# Patient Record
Sex: FEMALE | Race: WHITE | Employment: UNEMPLOYED | URBAN - METROPOLITAN AREA
[De-identification: names, ages, dates, MRNs, and addresses within clinical notes are randomized per-mention and may not be internally consistent; named-entity substitution may affect disease eponyms.]

---

## 2017-09-29 ENCOUNTER — HOSPITAL ENCOUNTER (OUTPATIENT)
Dept: GENERAL RADIOLOGY | Age: 57
Discharge: HOME | End: 2017-09-29
Payer: COMMERCIAL

## 2017-09-29 DIAGNOSIS — R52 PAIN: ICD-10-CM

## 2017-09-29 PROCEDURE — 73560 X-RAY EXAM OF KNEE 1 OR 2: CPT

## 2017-10-10 ENCOUNTER — HOSPITAL ENCOUNTER (OUTPATIENT)
Dept: PHYSICAL THERAPY | Age: 57
Discharge: HOME | End: 2017-10-10
Payer: COMMERCIAL

## 2017-10-10 PROCEDURE — 97162 PT EVAL MOD COMPLEX 30 MIN: CPT

## 2017-10-10 PROCEDURE — 97110 THERAPEUTIC EXERCISES: CPT

## 2017-10-10 NOTE — PROGRESS NOTES
Ambulatory/Rehab Services H2 Model Falls Risk Assessment    Risk Factor Pts. ·   Confusion/Disorientation/Impulsivity  []    4 ·   Symptomatic Depression  []   2 ·   Altered Elimination  []   1 ·   Dizziness/Vertigo  []   1 ·   Gender (Male)  []   1 ·   Any administered antiepileptics (anticonvulsants):  []   2 ·   Any administered benzodiazepines:  []   1 ·   Visual Impairment (specify):  []   1 ·   Portable Oxygen Use  []   1 ·   Orthostatic ? BP  []   1 ·   History of Recent Falls (within 3 mos.)  []   5     Ability to Rise from Chair (choose one) Pts. ·   Ability to rise in a single movement  []   0 ·   Pushes up, successful in one attempt  []   1 ·   Multiple attempts, but successful  []   3 ·   Unable to rise without assistance  []   4   Total: (5 or greater = High Risk) 0     Falls Prevention Plan:   []                Physical Limitations to Exercise (specify):   []                Mobility Assistance Device (type):   []                Exercise/Equipment Adaptation (specify):    ©2010 [facility] of [address] Patent #[id number].  Federal Law prohibits the replication, distribution or use without written permission from [facility] [employer]ADEUM

## 2017-10-10 NOTE — PROGRESS NOTES
Suleiman Bethea  : 1960  Payor: Astrid Martinez / [place]UE E[place]ENTIAL[place] DARCY / Product Type: DARCY /  [address]  at [facility] OSIRIS ZIMMER  [address] [facility], [address] [place] [address] [address], [address] [facility]  [phone]   [phone]       OUTPATIENT PHYSICAL [place] Assessment 10/10/2017    ICD-10: Treatment Diagnosis: (M25.562) Pain in left knee  (M25.561) Pain in right knee  Precautions/Allergies:   Codeine   Fall Risk Score: 0 (? 5 = High Risk)  MD Orders: eval and treat aquatic therapy preferrable MEDICAL/REFERRING DIAGNOSIS:  Pain in left knee [M25.562]  Pain in right knee [M25.561]   DATE OF ONSET: L knee 1 yr ago,  R knee chronic  REFERRING PHYSICIAN: Sami Gutierrez DO RETURN PHYSICIAN APPOINTMENT: 17     INITIAL ASSESSMENT:  Ms. Felecia Damon presents with B knee pain which s   PROBLEM LIST (Impacting functional limitations):  1. Decreased Strength  2. Decreased Ambulation Ability/Technique  3. Increased Pain  4. Decreased Activity Tolerance INTERVENTIONS PLANNED:  1. Home Exercise Program (HEP)  2. Therapeutic Exercise/Strengthening  3. aquatic therapy   TREATMENT PLAN:  Effective Dates: 10/10/17 TO 1/10/17. Frequency/Duration: 2 times a week for 8 weeks  GOALS: (Goals have been discussed and agreed upon with patient.)  Short-Term Functional Goals: Time Frame: 2 weeks  1. Pt to perform 45 minutes of aquatic therapy consistently. 2. Pt independent in HEP to address deficits. 3. Pt to report a decrease in symptoms  Discharge Goals: Time Frame: 8 weeks  1. Increase in LEFS score by 10 points indicating improved function. 2. Increase in strength of HS and quads to improve stamina for gait activities. 3. Pt independent in HEP to maintain gains made in PT  4. Pt to report pain 3/10 or less consistently.   Rehabilitation Potential For Stated Goals: [address] [facility] therapy, I certify that the treatment plan above will be carried out by a therapist or under their direction. Thank you for this referral,  Nika Sorenson, PT     Referring Physician Signature: Sami Gutierrez,               Date                    The information in this section was collected on 10/10/17 (except where otherwise noted). HISTORY:   History of Present Injury/Illness (Reason for Referral):  B knee pain worse in the morning. Increased with housework, driving long distances  Describes sharp tingling from knee to big toe bilaterally. Had an MRI of R knee- which showed a small cartilage tear. Pt states the more she does the more her knees hurt. Rates pain 5/10 worst -9/10 worst, 3/10 best.    Past Medical History/Comorbidities:   Previously had shingles which affected her L knee area, IBS,   Social History/Living Environment:    lives with . Stairs at home. Prior Level of Function/Work/Activity:    Other Clinical Tests:          MRI, X ray had fluid removed, cortisone shots were not helpful  Personal Factors: Other factors that influence how disability is experienced by the patient:  Pt's  is on disability from his job. Pt has not worked in 2 yrs as she was laid off. Current Medications:       Current Outpatient Prescriptions:     ALPRAZolam (XANAX) 0.5 mg tablet, One tablet 30 minutes prior to procedure., Disp: 4 Tab, Rfl: 0    celecoxib (CELEBREX) 200 mg capsule, TAKE 1 CAPSULE TWICE A DAY, Disp: , Rfl: 5    DULoxetine (CYMBALTA) 60 mg capsule, Take 1 Cap by mouth daily. , Disp: 30 Cap, Rfl: 12   Date Last Reviewed:  10/10/2017   Number of Personal Factors/Comorbidities that affect the Plan of Care: 3+: HIGH COMPLEXITY   EXAMINATION:   Observation/Orthostatic Postural Assessment:          L knee has minimal swelling  Palpation:          Tender to palpation medial and lateral joint line of B knees  ROM:          ROM WNL  Strength:          B HS weakness 4/5,            Body Structures Involved:  1. Joints  2.  Muscles Body Functions Affected:  1. Sensory/Pain  2. Neuromusculoskeletal Activities and Participation Affected:  1. Mobility  2. Domestic Life   Number of elements (examined above) that affect the Plan of Care: 4+: HIGH COMPLEXITY   CLINICAL PRESENTATION:   Presentation: Evolving clinical presentation with changing clinical characteristics: MODERATE COMPLEXITY   CLINICAL DECISION MAKING:   Outcome Measure: Tool Used: Lower Extremity Functional Scale (LEFS)  Score:  Initial: 21/80 Most Recent: X/80 (Date: -- )   Interpretation of Score: 20 questions each scored on a 5 point scale with 0 representing \"extreme difficulty or unable to perform\" and 4 representing \"no difficulty\". The lower the score, the greater the functional disability. 80/80 represents no disability. Minimal detectable change is 9 points. Score 80 79-63 62-48 47-32 31-16 15-1 0   Modifier CH CI CJ CK CL CM CN           Medical Necessity:   · Pt requires PT for rehabilitation and guidance for B knee pain affecting mobility. .  Reason for Services/Other Comments:  · Pt to begin aquatic therapy. .   Use of outcome tool(s) and clinical judgement create a POC that gives a: Questionable prediction of patient's progress: MODERATE COMPLEXITY            TREATMENT:   (In addition to Assessment/Re-Assessment sessions the following treatments were rendered)  Pre-treatment Symptoms/Complaints:  Pt rated pain from 3-7 during evaluation and ex. Pain: Initial:   5/10  Post Session:  7/10     Therapeutic Exercise: ( ):  15 min Exercises per grid below to improve mobility and strength. Required moderate visual and verbal cues to promote proper body alignment, promote proper body posture and promote proper body mechanics. Progressed repetitions as indicated.    Date:  10/10 Date:   Date:     Activity/Exercise Parameters Parameters Parameters   Quad sets 10     SLR 10     SAQ 10     Prone knee flex 10     Clam shell 10                 HEP - print out for above ex given for HEP.  Treatment/Session Assessment:    · Response to Treatment: Pt had increased pain with all movements during evaluation. She also complained of increasing tingling into B big toes. She was able to do the exercises. · Compliance with Program/Exercises: Will assess as treatment progresses. · Recommendations/Intent for next treatment session: \"Next visit will focus on aquatic therapy\".   Total Treatment Duration: 60 min  PT Patient Time In/Time Out  Time In: 0100  Time Out: 0200    Nain Skinner PT

## 2017-10-16 ENCOUNTER — HOSPITAL ENCOUNTER (OUTPATIENT)
Dept: PHYSICAL THERAPY | Age: 57
Discharge: HOME | End: 2017-10-16
Payer: COMMERCIAL

## 2017-10-16 PROCEDURE — 97113 AQUATIC THERAPY/EXERCISES: CPT

## 2017-10-16 NOTE — PROGRESS NOTES
Nela Roman  : 1960  Payor: Cami Bedoya / [place]ENTIAL[place] DARCY / Product Type: DARCY /  [address]  at [address] [place] [address] Rd  [address] [facility], [address] [place] [address], [facility] [address]  [phone]   [phone]       OUTPATIENT PHYSICAL [address] Note 10/16/2017    ICD-10: Treatment Diagnosis: (M25.562) Pain in left knee  (M25.561) Pain in right knee  Precautions/Allergies:   Codeine   Fall Risk Score: 0 (? 5 = High Risk)  MD Orders: eval and treat aquatic therapy preferrable MEDICAL/REFERRING DIAGNOSIS:  Pain in left knee [M25.562]  Pain in right knee [M25.561]   DATE OF ONSET: L knee 1 yr ago,  R knee chronic  REFERRING PHYSICIAN: Sami Gutierrez DO  RETURN PHYSICIAN APPOINTMENT: 17     INITIAL ASSESSMENT:  Ms. Brian Ho presents with B knee pain which s   PROBLEM LIST (Impacting functional limitations):  1. Decreased Strength  2. Decreased Ambulation Ability/Technique  3. Increased Pain  4. Decreased Activity Tolerance INTERVENTIONS PLANNED:  1. Home Exercise Program (HEP)  2. Therapeutic Exercise/Strengthening  3. aquatic therapy   TREATMENT PLAN:  Effective Dates: 10/10/17 TO 1/10/17. Frequency/Duration: 2 times a week for 8 weeks  GOALS: (Goals have been discussed and agreed upon with patient.)  Short-Term Functional Goals: Time Frame: 2 weeks  1. Pt to perform 45 minutes of aquatic therapy consistently. 2. Pt independent in HEP to address deficits. 3. Pt to report a decrease in symptoms  Discharge Goals: Time Frame: 8 weeks  1. Increase in LEFS score by 10 points indicating improved function. 2. Increase in strength of HS and quads to improve stamina for gait activities. 3. Pt independent in HEP to maintain gains made in PT  4. Pt to report pain 3/10 or less consistently.   Rehabilitation Potential For Stated Goals: 3000 [facility] therapy, I certify that the treatment plan above will be carried out by a therapist or under their direction. Thank you for this referral,  Norm Anderson PT     Referring Physician Signature: Sami Gutierrez,               Date                    The information in this section was collected on 10/10/17 (except where otherwise noted). HISTORY:   History of Present Injury/Illness (Reason for Referral):  B knee pain worse in the morning. Increased with housework, driving long distances  Describes sharp tingling from knee to big toe bilaterally. Had an MRI of R knee- which showed a small cartilage tear. Pt states the more she does the more her knees hurt. Rates pain 5/10 worst -9/10 worst, 3/10 best.    Past Medical History/Comorbidities:   Previously had shingles which affected her L knee area, IBS,   Social History/Living Environment:    lives with . Stairs at home. Prior Level of Function/Work/Activity:    Other Clinical Tests:          MRI, X ray had fluid removed, cortisone shots were not helpful  Personal Factors: Other factors that influence how disability is experienced by the patient:  Pt's  is on disability from his job. Pt has not worked in 2 yrs as she was laid off. Current Medications:       Current Outpatient Prescriptions:     ALPRAZolam (XANAX) 0.5 mg tablet, One tablet 30 minutes prior to procedure., Disp: 4 Tab, Rfl: 0    celecoxib (CELEBREX) 200 mg capsule, TAKE 1 CAPSULE TWICE A DAY, Disp: , Rfl: 5    DULoxetine (CYMBALTA) 60 mg capsule, Take 1 Cap by mouth daily. , Disp: 30 Cap, Rfl: 12   Date Last Reviewed:  10/16/2017   Number of Personal Factors/Comorbidities that affect the Plan of Care: 3+: HIGH COMPLEXITY   EXAMINATION:   Observation/Orthostatic Postural Assessment:          L knee has minimal swelling  Palpation:          Tender to palpation medial and lateral joint line of B knees  ROM:          ROM WNL  Strength:          B HS weakness 4/5,            Body Structures Involved:  1. Joints  2.  Muscles Body Functions Affected:  1. Sensory/Pain  2. Neuromusculoskeletal Activities and Participation Affected:  1. Mobility  2. Domestic Life   Number of elements (examined above) that affect the Plan of Care: 4+: HIGH COMPLEXITY   CLINICAL PRESENTATION:   Presentation: Evolving clinical presentation with changing clinical characteristics: MODERATE COMPLEXITY   CLINICAL DECISION MAKING:   Outcome Measure: Tool Used: Lower Extremity Functional Scale (LEFS)  Score:  Initial: 21/80 Most Recent: X/80 (Date: -- )   Interpretation of Score: 20 questions each scored on a 5 point scale with 0 representing \"extreme difficulty or unable to perform\" and 4 representing \"no difficulty\". The lower the score, the greater the functional disability. 80/80 represents no disability. Minimal detectable change is 9 points. Score 80 79-63 62-48 47-32 31-16 15-1 0   Modifier CH CI CJ CK CL CM CN           Medical Necessity:   · Pt requires PT for rehabilitation and guidance for B knee pain affecting mobility. .  Reason for Services/Other Comments:  · Pt to begin aquatic therapy. .   Use of outcome tool(s) and clinical judgement create a POC that gives a: Questionable prediction of patient's progress: MODERATE COMPLEXITY            TREATMENT:   (In addition to Assessment/Re-Assessment sessions the following treatments were rendered)  Pre-treatment Symptoms/Complaints:  Pt stated she was sore from doing her home exercises. Pain: Initial:   5/10  Post Session:  5/10   Aquatic Therapy (45 minutes): Aquatic treatment performed per flow grid for Decreased muscle strength, Decreased static/dynamic balance and reactive control, Decompression, Ease of movement and Low impact and reduced weight bearing activity. Cues provided for ex,gait and posture.       Aquatic Exercise Log       Date  10/16 Date   Date   Date   Date     Activity/ Exercise Parameters Parameters Parameters Parameters Parameters   Walking forward 6       Walking backward 6       Walking sideways 6         Marching 6         Goose Step 6         Tip toes 3         Heels 3         Lunges        Side step squats        LE Exercises          Hip Flex/Ext 10         Hip Abd/Add 10         Hip IR/ER          Calf raises 10         Knee Flex 10         Squats          Leg Circles 10/10         Step Ups 10       UE Exercises          Squeeze In          Push Down          Pull Down          Bicep/Tricep        Rows/Press outs         Chi Positions          Trunk Rotation        Deep H2O/ Noodles 7' with noodle and assist         Stabilization          Arms only          Legs only Jog 2 min       Cross   Country 2 min         Scissors 1 min         Crab walk        Lower abdominal   work           Cardio          Jogging        Lap   Swimming          Stretches          Hamstrings          Heelcords          Piriformis          Neck            Treatment/Session Assessment:    · Response to Treatment: Pt did well with aquatic therapy. She has difficulty following directions at times. She had knee pain and complained of pain down front of shin into big toe. · Compliance with Program/Exercises: Will assess as treatment progresses. · Recommendations/Intent for next treatment session: \"Next visit will focus on aquatic therapy\".   Total Treatment Duration: 45 min  PT Patient Time In/Time Out  Time In: 1230  Time Out: 0115    Ashley Olivares PT

## 2017-10-18 ENCOUNTER — HOSPITAL ENCOUNTER (OUTPATIENT)
Dept: PHYSICAL THERAPY | Age: 57
Discharge: HOME | End: 2017-10-18
Payer: COMMERCIAL

## 2017-10-18 PROCEDURE — 97113 AQUATIC THERAPY/EXERCISES: CPT

## 2017-10-18 NOTE — PROGRESS NOTES
Janey Ponce  : 1960  Payor: Javan Oconnor / [place]ENTIAL[place] DARCY / Product Type: DARCY /  [address] at [facility] OSIRIS ZIMMER  1101 [facility], [address] [place] [address],[address], [address]  [phone]   [phone]       OUTPATIENT PHYSICAL [address] Note 10/18/2017    ICD-10: Treatment Diagnosis: (M25.562) Pain in left knee  (M25.561) Pain in right knee  Precautions/Allergies:   Codeine   Fall Risk Score: 0 (? 5 = High Risk)  MD Orders: eval and treat aquatic therapy preferrable MEDICAL/REFERRING DIAGNOSIS:  Pain in left knee [M25.562]  Pain in right knee [M25.561]   DATE OF ONSET: L knee 1 yr ago,  R knee chronic  REFERRING PHYSICIAN: Sami Gutierrez DO  RETURN PHYSICIAN APPOINTMENT: 17     INITIAL ASSESSMENT:  Ms. Jenaro Murphy presents with B knee pain which s   PROBLEM LIST (Impacting functional limitations):  1. Decreased Strength  2. Decreased Ambulation Ability/Technique  3. Increased Pain  4. Decreased Activity Tolerance INTERVENTIONS PLANNED:  1. Home Exercise Program (HEP)  2. Therapeutic Exercise/Strengthening  3. aquatic therapy   TREATMENT PLAN:  Effective Dates: 10/10/17 TO 1/10/17. Frequency/Duration: 2 times a week for 8 weeks  GOALS: (Goals have been discussed and agreed upon with patient.)  Short-Term Functional Goals: Time Frame: 2 weeks  1. Pt to perform 45 minutes of aquatic therapy consistently. 2. Pt independent in HEP to address deficits. 3. Pt to report a decrease in symptoms  Discharge Goals: Time Frame: 8 weeks  1. Increase in LEFS score by 10 points indicating improved function. 2. Increase in strength of HS and quads to improve stamina for gait activities. 3. Pt independent in HEP to maintain gains made in PT  4. Pt to report pain 3/10 or less consistently.   Rehabilitation Potential For Stated Goals: 3000 [facility] therapy, I certify that the treatment plan above will be carried out by a therapist or under their direction. Thank you for this referral,  Evelyn Saldivar, PT     Referring Physician Signature: Sami Gutierrez,               Date                    The information in this section was collected on 10/10/17 (except where otherwise noted). HISTORY:   History of Present Injury/Illness (Reason for Referral):  B knee pain worse in the morning. Increased with housework, driving long distances  Describes sharp tingling from knee to big toe bilaterally. Had an MRI of R knee- which showed a small cartilage tear. Pt states the more she does the more her knees hurt. Rates pain 5/10 worst -9/10 worst, 3/10 best.    Past Medical History/Comorbidities:   Previously had shingles which affected her L knee area, IBS,   Social History/Living Environment:    lives with . Stairs at home. Prior Level of Function/Work/Activity:    Other Clinical Tests:          MRI, X ray had fluid removed, cortisone shots were not helpful  Personal Factors: Other factors that influence how disability is experienced by the patient:  Pt's  is on disability from his job. Pt has not worked in 2 yrs as she was laid off. Current Medications:       Current Outpatient Prescriptions:     ALPRAZolam (XANAX) 0.5 mg tablet, One tablet 30 minutes prior to procedure., Disp: 4 Tab, Rfl: 0    celecoxib (CELEBREX) 200 mg capsule, TAKE 1 CAPSULE TWICE A DAY, Disp: , Rfl: 5    DULoxetine (CYMBALTA) 60 mg capsule, Take 1 Cap by mouth daily. , Disp: 30 Cap, Rfl: 12   Date Last Reviewed:  10/18/2017   Number of Personal Factors/Comorbidities that affect the Plan of Care: 3+: HIGH COMPLEXITY   EXAMINATION:   Observation/Orthostatic Postural Assessment:          L knee has minimal swelling  Palpation:          Tender to palpation medial and lateral joint line of B knees  ROM:          ROM WNL  Strength:          B HS weakness 4/5,            Body Structures Involved:  1. Joints  2.  Muscles Body Functions Affected:  1. Sensory/Pain  2. Neuromusculoskeletal Activities and Participation Affected:  1. Mobility  2. Domestic Life   Number of elements (examined above) that affect the Plan of Care: 4+: HIGH COMPLEXITY   CLINICAL PRESENTATION:   Presentation: Evolving clinical presentation with changing clinical characteristics: MODERATE COMPLEXITY   CLINICAL DECISION MAKING:   Outcome Measure: Tool Used: Lower Extremity Functional Scale (LEFS)  Score:  Initial: 21/80 Most Recent: X/80 (Date: -- )   Interpretation of Score: 20 questions each scored on a 5 point scale with 0 representing \"extreme difficulty or unable to perform\" and 4 representing \"no difficulty\". The lower the score, the greater the functional disability. 80/80 represents no disability. Minimal detectable change is 9 points. Score 80 79-63 62-48 47-32 31-16 15-1 0   Modifier CH CI CJ CK CL CM CN           Medical Necessity:   · Pt requires PT for rehabilitation and guidance for B knee pain affecting mobility. .  Reason for Services/Other Comments:  · Pt to begin aquatic therapy. .   Use of outcome tool(s) and clinical judgement create a POC that gives a: Questionable prediction of patient's progress: MODERATE COMPLEXITY            TREATMENT:   (In addition to Assessment/Re-Assessment sessions the following treatments were rendered)  Pre-treatment Symptoms/Complaints:  Pt stated she was very sore Monday night after last pool session. Rating pain at that time a 20. Reassured pt that we would hold ex steady and not increase today. Pain: Initial:   5/10  Post Session:  5/10   Aquatic Therapy (45 minutes): Aquatic treatment performed per flow grid for Decreased muscle strength, Decreased static/dynamic balance and reactive control, Decompression, Ease of movement and Low impact and reduced weight bearing activity. Cues provided for ex,gait and posture.       Aquatic Exercise Log       Date  10/16 Date  10/18 Date   Date   Date     Activity/ Exercise Parameters Parameters Parameters Parameters Parameters   Walking forward 6 6      Walking backward 6 6      Walking sideways 6 6        Marching 6 6        Goose Step 6 6        Tip toes 3 3        Heels 3 3        Lunges        Side step squats        LE Exercises          Hip Flex/Ext 10 10        Hip Abd/Add 10 10        Hip IR/ER          Calf raises 10 10        Knee Flex 10 10        Squats          Leg Circles 10/10 10/10        Step Ups 10 10      UE Exercises          Squeeze In          Push Down          Pull Down          Bicep/Tricep        Rows/Press outs         Chi Positions          Trunk Rotation        Deep H2O/ Noodles 7' with noodle and assist 7' with noodle and assist        Stabilization          Arms only          Legs only Jog 2 min Jog 2 min      Cross   Country 2 min 2 min        Scissors 1 min 1 min        Crab walk        Lower abdominal   work           Cardio          Jogging        Lap   Swimming          Stretches          Hamstrings          Heelcords          quads  5 x 10 sec        Neck            Treatment/Session Assessment:    · Response to Treatment: Pt did well with aquatic therapy. Pt had fewer complaints of pain during therapy today. Pt continues to have difficulty with instructions. · Compliance with Program/Exercises: Will assess as treatment progresses. · Recommendations/Intent for next treatment session: \"Next visit will focus on aquatic therapy\".   Total Treatment Duration: 45 min  PT Patient Time In/Time Out  Time In: 0315  Time Out: 0400    Evelyn Saldivar, PT

## 2017-10-23 ENCOUNTER — HOSPITAL ENCOUNTER (OUTPATIENT)
Dept: PHYSICAL THERAPY | Age: 57
End: 2017-10-23
Payer: COMMERCIAL

## 2017-10-25 ENCOUNTER — HOSPITAL ENCOUNTER (OUTPATIENT)
Dept: PHYSICAL THERAPY | Age: 57
Discharge: HOME | End: 2017-10-25
Payer: COMMERCIAL

## 2017-10-25 PROCEDURE — 97113 AQUATIC THERAPY/EXERCISES: CPT

## 2017-10-30 ENCOUNTER — HOSPITAL ENCOUNTER (OUTPATIENT)
Dept: PHYSICAL THERAPY | Age: 57
Discharge: HOME | End: 2017-10-30
Payer: COMMERCIAL

## 2017-10-30 PROCEDURE — 97113 AQUATIC THERAPY/EXERCISES: CPT

## 2017-10-30 NOTE — PROGRESS NOTES
Suleiman Bethea  : 1960  Payor: Astrid Martinez / [place]ENTIAL[place] DARCY / Product Type: DARCY /  [address]  at [facility] OSIRIS ZIMMER  1101 [facility], [address] [place] [address] [address], [address] [facility]  [phone]   [phone]       OUTPATIENT PHYSICAL [address] Note 10/30/2017    ICD-10: Treatment Diagnosis: (M25.562) Pain in left knee  (M25.561) Pain in right knee  Precautions/Allergies:   Codeine   Fall Risk Score: 0 (? 5 = High Risk)  MD Orders: eval and treat aquatic therapy preferrable MEDICAL/REFERRING DIAGNOSIS:  Pain in left knee [M25.562]  Pain in right knee [M25.561]   DATE OF ONSET: L knee 1 yr ago,  R knee chronic  REFERRING PHYSICIAN: Sami Gutierrez DO RETURN PHYSICIAN APPOINTMENT: 17     INITIAL ASSESSMENT:  Ms. Felecia Damon presents with B knee pain which s   PROBLEM LIST (Impacting functional limitations):  1. Decreased Strength  2. Decreased Ambulation Ability/Technique  3. Increased Pain  4. Decreased Activity Tolerance INTERVENTIONS PLANNED:  1. Home Exercise Program (HEP)  2. Therapeutic Exercise/Strengthening  3. aquatic therapy   TREATMENT PLAN:  Effective Dates: 10/10/17 TO 1/10/17. Frequency/Duration: 2 times a week for 8 weeks  GOALS: (Goals have been discussed and agreed upon with patient.)  Short-Term Functional Goals: Time Frame: 2 weeks  1. Pt to perform 45 minutes of aquatic therapy consistently. 2. Pt independent in HEP to address deficits. 3. Pt to report a decrease in symptoms  Discharge Goals: Time Frame: 8 weeks  1. Increase in LEFS score by 10 points indicating improved function. 2. Increase in strength of HS and quads to improve stamina for gait activities. 3. Pt independent in HEP to maintain gains made in PT  4. Pt to report pain 3/10 or less consistently.   Rehabilitation Potential For Stated Goals: 3000 [facility] therapy, I certify that the treatment plan above will be carried out by a therapist or under their direction. Thank you for this referral,  Demetrio Hinojosa, PT     Referring Physician Signature: Sami Gutierrez, DO              Date                    The information in this section was collected on 10/10/17 (except where otherwise noted). HISTORY:   History of Present Injury/Illness (Reason for Referral):  B knee pain worse in the morning. Increased with housework, driving long distances  Describes sharp tingling from knee to big toe bilaterally. Had an MRI of R knee- which showed a small cartilage tear. Pt states the more she does the more her knees hurt. Rates pain 5/10 worst -9/10 worst, 3/10 best.    Past Medical History/Comorbidities:   Previously had shingles which affected her L knee area, IBS,   Social History/Living Environment:    lives with . Stairs at home. Prior Level of Function/Work/Activity:    Other Clinical Tests:          MRI, X ray had fluid removed, cortisone shots were not helpful  Personal Factors: Other factors that influence how disability is experienced by the patient:  Pt's  is on disability from his job. Pt has not worked in 2 yrs as she was laid off. Current Medications:       Current Outpatient Prescriptions:     ALPRAZolam (XANAX) 0.5 mg tablet, One tablet 30 minutes prior to procedure., Disp: 4 Tab, Rfl: 0    celecoxib (CELEBREX) 200 mg capsule, TAKE 1 CAPSULE TWICE A DAY, Disp: , Rfl: 5    DULoxetine (CYMBALTA) 60 mg capsule, Take 1 Cap by mouth daily. , Disp: 30 Cap, Rfl: 12   Date Last Reviewed:  10/30/2017   Number of Personal Factors/Comorbidities that affect the Plan of Care: 3+: HIGH COMPLEXITY   EXAMINATION:   Observation/Orthostatic Postural Assessment:          L knee has minimal swelling  Palpation:          Tender to palpation medial and lateral joint line of B knees  ROM:          ROM WNL  Strength:          B HS weakness 4/5,            Body Structures Involved:  1. Joints  2.  Muscles Body Functions Affected:  1. Sensory/Pain  2. Neuromusculoskeletal Activities and Participation Affected:  1. Mobility  2. Domestic Life   Number of elements (examined above) that affect the Plan of Care: 4+: HIGH COMPLEXITY   CLINICAL PRESENTATION:   Presentation: Evolving clinical presentation with changing clinical characteristics: MODERATE COMPLEXITY   CLINICAL DECISION MAKING:   Outcome Measure: Tool Used: Lower Extremity Functional Scale (LEFS)  Score:  Initial: 21/80 Most Recent: X/80 (Date: -- )   Interpretation of Score: 20 questions each scored on a 5 point scale with 0 representing \"extreme difficulty or unable to perform\" and 4 representing \"no difficulty\". The lower the score, the greater the functional disability. 80/80 represents no disability. Minimal detectable change is 9 points. Score 80 79-63 62-48 47-32 31-16 15-1 0   Modifier CH CI CJ CK CL CM CN           Medical Necessity:   · Pt requires PT for rehabilitation and guidance for B knee pain affecting mobility. .  Reason for Services/Other Comments:  · Pt to begin aquatic therapy. .   Use of outcome tool(s) and clinical judgement create a POC that gives a: Questionable prediction of patient's progress: MODERATE COMPLEXITY            TREATMENT:   (In addition to Assessment/Re-Assessment sessions the following treatments were rendered)  Pre-treatment Symptoms/Complaints:  States her knees are still hurting but may be slightly better  Pain: Initial:   7/10  Post Session:  7/10   Aquatic Therapy (45 minutes): Aquatic treatment performed per flow grid for Decreased muscle strength, Decreased static/dynamic balance and reactive control, Decompression, Ease of movement and Low impact and reduced weight bearing activity. Cues provided for ex,gait and posture.       Aquatic Exercise Log       Date  10/16 Date  10/18 Date  10/25 Date  10/30 Date     Activity/ Exercise Parameters Parameters Parameters Parameters Parameters   Walking forward 6 6 6 6    Walking backward 6 6 6 6    Walking sideways 6 6 6 6      Marching 6 6 6 6      Goose Step 6 6 6 6      Tip toes 3 3 3 3      Heels 3 3 3 3      Lunges        Side step squats        LE Exercises          Hip Flex/Ext 10 10 15 15      Hip Abd/Add 10 10 15 15      Hip IR/ER          Calf raises 10 10 15 15      Knee Flex 10 10 15 15      Squats          Leg Circles 10/10 10/10 10/10 10/10      Step Ups 10 10 10 10    UE Exercises          Squeeze In          Push Down          Pull Down          Bicep/Tricep        Rows/Press outs         Chi Positions          Trunk Rotation        Deep H2O/ Noodles 7' with noodle and assist 7' with noodle and assist 7' with 2# and noodle and assist 7' with 2# and noodle assist      Stabilization          Arms only          Legs only Jog 2 min Jog 2 min Jog 2 min Jog 2 min    Cross   Country 2 min 2 min 2 min 2 min      Scissors 1 min 1 min 2 min 2 min      Crab walk        Lower abdominal   work           Cardio          Jogging        Lap   Swimming          Stretches          Hamstrings          Heelcords          quads  5 x 10 sec 5 x 10 sec 5 x 10 sec      Neck            Treatment/Session Assessment:    · Response to Treatment: Pt did well with exercises. States her knees hurt a bit more after exercising. · Compliance with Program/Exercises: Will assess as treatment progresses. · Recommendations/Intent for next treatment session: \"Next visit will focus on aquatic therapy\".   Total Treatment Duration: 45 min  PT Patient Time In/Time Out  Time In: 1230  Time Out: 0115    Ivone Montoya, PT

## 2017-11-01 ENCOUNTER — HOSPITAL ENCOUNTER (OUTPATIENT)
Dept: PHYSICAL THERAPY | Age: 57
Discharge: HOME | End: 2017-11-01
Payer: COMMERCIAL

## 2017-11-01 PROCEDURE — 97113 AQUATIC THERAPY/EXERCISES: CPT

## 2017-11-01 NOTE — PROGRESS NOTES
Jacquie Pinto  : 1960  Payor: Corie Jacques / [place]UE [place]ENTIAL[place] DARCY / Product Type: DARCY /  [address]  at [facility] OSIRIS ZIMMER  1101 [facility], [address] [place] [address],[address]  [phone]   [phone]       OUTPATIENT PHYSICAL [address] Note 2017    ICD-10: Treatment Diagnosis: (M25.562) Pain in left knee  (M25.561) Pain in right knee  Precautions/Allergies:   Codeine   Fall Risk Score: 0 (? 5 = High Risk)  MD Orders: eval and treat aquatic therapy preferrable MEDICAL/REFERRING DIAGNOSIS:  Pain in left knee [M25.562]  Pain in right knee [M25.561]   DATE OF ONSET: L knee 1 yr ago,  R knee chronic  REFERRING PHYSICIAN: Sami Gutierrez DO  RETURN PHYSICIAN APPOINTMENT: 17     INITIAL ASSESSMENT:  Ms. Megan Mcintosh presents with B knee pain which s   PROBLEM LIST (Impacting functional limitations):  1. Decreased Strength  2. Decreased Ambulation Ability/Technique  3. Increased Pain  4. Decreased Activity Tolerance INTERVENTIONS PLANNED:  1. Home Exercise Program (HEP)  2. Therapeutic Exercise/Strengthening  3. aquatic therapy   TREATMENT PLAN:  Effective Dates: 10/10/17 TO 1/10/17. Frequency/Duration: 2 times a week for 8 weeks  GOALS: (Goals have been discussed and agreed upon with patient.)  Short-Term Functional Goals: Time Frame: 2 weeks  1. Pt to perform 45 minutes of aquatic therapy consistently. 2. Pt independent in HEP to address deficits. 3. Pt to report a decrease in symptoms  Discharge Goals: Time Frame: 8 weeks  1. Increase in LEFS score by 10 points indicating improved function. 2. Increase in strength of HS and quads to improve stamina for gait activities. 3. Pt independent in HEP to maintain gains made in PT  4. Pt to report pain 3/10 or less consistently.   Rehabilitation Potential For Stated Goals: 3000 [facility] therapy, I certify that the treatment plan above will be carried out by a therapist or under their direction. Thank you for this referral,  Justin Leonard PT     Referring Physician Signature: Sami Gutierrez,               Date                    The information in this section was collected on 10/10/17 (except where otherwise noted). HISTORY:   History of Present Injury/Illness (Reason for Referral):  B knee pain worse in the morning. Increased with housework, driving long distances  Describes sharp tingling from knee to big toe bilaterally. Had an MRI of R knee- which showed a small cartilage tear. Pt states the more she does the more her knees hurt. Rates pain 5/10 worst -9/10 worst, 3/10 best.    Past Medical History/Comorbidities:   Previously had shingles which affected her L knee area, IBS,   Social History/Living Environment:    lives with . Stairs at home. Prior Level of Function/Work/Activity:    Other Clinical Tests:          MRI, X ray had fluid removed, cortisone shots were not helpful  Personal Factors: Other factors that influence how disability is experienced by the patient:  Pt's  is on disability from his job. Pt has not worked in 2 yrs as she was laid off. Current Medications:       Current Outpatient Prescriptions:     ALPRAZolam (XANAX) 0.5 mg tablet, One tablet 30 minutes prior to procedure., Disp: 4 Tab, Rfl: 0    celecoxib (CELEBREX) 200 mg capsule, TAKE 1 CAPSULE TWICE A DAY, Disp: , Rfl: 5    DULoxetine (CYMBALTA) 60 mg capsule, Take 1 Cap by mouth daily. , Disp: 30 Cap, Rfl: 12   Date Last Reviewed:  11/1/2017   Number of Personal Factors/Comorbidities that affect the Plan of Care: 3+: HIGH COMPLEXITY   EXAMINATION:   Observation/Orthostatic Postural Assessment:          L knee has minimal swelling  Palpation:          Tender to palpation medial and lateral joint line of B knees  ROM:          ROM WNL  Strength:          B HS weakness 4/5,            Body Structures Involved:  1. Joints  2.  Muscles Body Functions Affected:  1. Sensory/Pain  2. Neuromusculoskeletal Activities and Participation Affected:  1. Mobility  2. Domestic Life   Number of elements (examined above) that affect the Plan of Care: 4+: HIGH COMPLEXITY   CLINICAL PRESENTATION:   Presentation: Evolving clinical presentation with changing clinical characteristics: MODERATE COMPLEXITY   CLINICAL DECISION MAKING:   Outcome Measure: Tool Used: Lower Extremity Functional Scale (LEFS)  Score:  Initial: 21/80 Most Recent: X/80 (Date: -- )   Interpretation of Score: 20 questions each scored on a 5 point scale with 0 representing \"extreme difficulty or unable to perform\" and 4 representing \"no difficulty\". The lower the score, the greater the functional disability. 80/80 represents no disability. Minimal detectable change is 9 points. Score 80 79-63 62-48 47-32 31-16 15-1 0   Modifier CH CI CJ CK CL CM CN           Medical Necessity:   · Pt requires PT for rehabilitation and guidance for B knee pain affecting mobility. .  Reason for Services/Other Comments:  · Pt to begin aquatic therapy. .   Use of outcome tool(s) and clinical judgement create a POC that gives a: Questionable prediction of patient's progress: MODERATE COMPLEXITY            TREATMENT:   (In addition to Assessment/Re-Assessment sessions the following treatments were rendered)  Pre-treatment Symptoms/Complaints:  States her knees are still hurting but may be slightly better  Pain: Initial:   6/10  Post Session:  7/10   Aquatic Therapy (45 minutes): Aquatic treatment performed per flow grid for Decreased muscle strength, Decreased static/dynamic balance and reactive control, Decompression, Ease of movement and Low impact and reduced weight bearing activity. Cues provided for ex,gait and posture.       Aquatic Exercise Log       Date  10/16 Date  10/18 Date  10/25 Date  10/30 Date  11/1   Activity/ Exercise Parameters Parameters Parameters Parameters Parameters   Walking forward 6 6 6 6 6   Walking backward 6 6 6 6 6   Walking sideways 6 6 6 6 6     Marching 6 6 6 6 6     Goose Step 6 6 6 6 6     Tip toes 3 3 3 3 3     Heels 3 3 3 3 3     Lunges        Side step squats        LE Exercises     2#     Hip Flex/Ext 10 10 15 15 15     Hip Abd/Add 10 10 15 15 15     Hip IR/ER          Calf raises 10 10 15 15 15     Knee Flex 10 10 15 15 15     Squats          Leg Circles 10/10 10/10 10/10 10/10 10/10     Step Ups 10 10 10 10 10   UE Exercises          Squeeze In          Push Down          Pull Down          Bicep/Tricep        Rows/Press outs         Chi Positions          Trunk Rotation        Deep H2O/ Noodles 7' with noodle and assist 7' with noodle and assist 7' with 2# and noodle and assist 7' with 2# and noodle assist 7' with 2 lbs     Stabilization          Arms only          Legs only Jog 2 min Jog 2 min Jog 2 min Jog 2 min Jog 2 min   Cross   Country 2 min 2 min 2 min 2 min 2 min     Scissors 1 min 1 min 2 min 2 min 2 min     Crab walk        Lower abdominal   work           Cardio          Jogging        Lap   Swimming          Stretches          Hamstrings          Heelcords          quads  5 x 10 sec 5 x 10 sec 5 x 10 sec 5 x 10 sec     Neck            Treatment/Session Assessment:    · Response to Treatment: States she is doing a bit better but knee do hurt more after exercises. · Compliance with Program/Exercises: Will assess as treatment progresses. · Recommendations/Intent for next treatment session: \"Next visit will focus on aquatic therapy\".   Total Treatment Duration: 45 min  PT Patient Time In/Time Out  Time In: 0230  Time Out: 0315    Edyta Gary, PT

## 2017-11-06 ENCOUNTER — HOSPITAL ENCOUNTER (OUTPATIENT)
Dept: PHYSICAL THERAPY | Age: 57
Discharge: HOME | End: 2017-11-06
Payer: COMMERCIAL

## 2017-11-06 PROCEDURE — 97113 AQUATIC THERAPY/EXERCISES: CPT

## 2017-11-06 NOTE — PROGRESS NOTES
Patricia Bennett  : 1960  Payor: Kingsley Alcala / [place] BLUE [place]ENTIAL[place] DARCY / Product Type: DARCY /  [address] at [facility] OSIRIS ZIMMER  1101 [facility], [address] [place] [address],[address]  [phone]   [phone]       OUTPATIENT PHYSICAL THERAPY:Daily Note 2017    ICD-10: Treatment Diagnosis: (M25.562) Pain in left knee  (M25.561) Pain in right knee  Precautions/Allergies:   Codeine   Fall Risk Score: 0 (? 5 = High Risk)  MD Orders: eval and treat aquatic therapy preferrable MEDICAL/REFERRING DIAGNOSIS:  Pain in left knee [M25.562]  Pain in right knee [M25.561]   DATE OF ONSET: L knee 1 yr ago,  R knee chronic  REFERRING PHYSICIAN: Sami Gutierrez DO  RETURN PHYSICIAN APPOINTMENT: 17     INITIAL ASSESSMENT:  Ms. Kamala Bethea presents with B knee pain which s   PROBLEM LIST (Impacting functional limitations):  1. Decreased Strength  2. Decreased Ambulation Ability/Technique  3. Increased Pain  4. Decreased Activity Tolerance INTERVENTIONS PLANNED:  1. Home Exercise Program (HEP)  2. Therapeutic Exercise/Strengthening  3. aquatic therapy   TREATMENT PLAN:  Effective Dates: 10/10/17 TO 1/10/17. Frequency/Duration: 2 times a week for 8 weeks  GOALS: (Goals have been discussed and agreed upon with patient.)  Short-Term Functional Goals: Time Frame: 2 weeks  1. Pt to perform 45 minutes of aquatic therapy consistently. 2. Pt independent in HEP to address deficits. 3. Pt to report a decrease in symptoms  Discharge Goals: Time Frame: 8 weeks  1. Increase in LEFS score by 10 points indicating improved function. 2. Increase in strength of HS and quads to improve stamina for gait activities. 3. Pt independent in HEP to maintain gains made in PT  4. Pt to report pain 3/10 or less consistently.   Rehabilitation Potential For Stated Goals: [address] [email or web address] therapy, I certify that the treatment plan above will be carried out by a therapist or under their direction. Thank you for this referral,  Ambrose Rodney, PT     Referring Physician Signature: Sami Gutierrez,               Date                    The information in this section was collected on 10/10/17 (except where otherwise noted). HISTORY:   History of Present Injury/Illness (Reason for Referral):  B knee pain worse in the morning. Increased with housework, driving long distances  Describes sharp tingling from knee to big toe bilaterally. Had an MRI of R knee- which showed a small cartilage tear. Pt states the more she does the more her knees hurt. Rates pain 5/10 worst -9/10 worst, 3/10 best.    Past Medical History/Comorbidities:   Previously had shingles which affected her L knee area, IBS,   Social History/Living Environment:    lives with . Stairs at home. Prior Level of Function/Work/Activity:    Other Clinical Tests:          MRI, X ray had fluid removed, cortisone shots were not helpful  Personal Factors: Other factors that influence how disability is experienced by the patient:  Pt's  is on disability from his job. Pt has not worked in 2 yrs as she was laid off. Current Medications:       Current Outpatient Prescriptions:     ALPRAZolam (XANAX) 0.5 mg tablet, One tablet 30 minutes prior to procedure., Disp: 4 Tab, Rfl: 0    celecoxib (CELEBREX) 200 mg capsule, TAKE 1 CAPSULE TWICE A DAY, Disp: , Rfl: 5    DULoxetine (CYMBALTA) 60 mg capsule, Take 1 Cap by mouth daily. , Disp: 30 Cap, Rfl: 12   Date Last Reviewed:  11/6/2017   Number of Personal Factors/Comorbidities that affect the Plan of Care: 3+: HIGH COMPLEXITY   EXAMINATION:   Observation/Orthostatic Postural Assessment:          L knee has minimal swelling  Palpation:          Tender to palpation medial and lateral joint line of B knees  ROM:          ROM WNL  Strength:          B HS weakness 4/5,            Body Structures Involved:  1. Joints  2.  Muscles Body Functions Affected:  1. Sensory/Pain  2. Neuromusculoskeletal Activities and Participation Affected:  1. Mobility  2. Domestic Life   Number of elements (examined above) that affect the Plan of Care: 4+: HIGH COMPLEXITY   CLINICAL PRESENTATION:   Presentation: Evolving clinical presentation with changing clinical characteristics: MODERATE COMPLEXITY   CLINICAL DECISION MAKING:   Outcome Measure: Tool Used: Lower Extremity Functional Scale (LEFS)  Score:  Initial: 21/80 Most Recent: X/80 (Date: -- )   Interpretation of Score: 20 questions each scored on a 5 point scale with 0 representing \"extreme difficulty or unable to perform\" and 4 representing \"no difficulty\". The lower the score, the greater the functional disability. 80/80 represents no disability. Minimal detectable change is 9 points. Score 80 79-63 62-48 47-32 31-16 15-1 0   Modifier CH CI CJ CK CL CM CN           Medical Necessity:   · Pt requires PT for rehabilitation and guidance for B knee pain affecting mobility. .  Reason for Services/Other Comments:  · Pt to begin aquatic therapy. .   Use of outcome tool(s) and clinical judgement create a POC that gives a: Questionable prediction of patient's progress: MODERATE COMPLEXITY            TREATMENT:   (In addition to Assessment/Re-Assessment sessions the following treatments were rendered)  Pre-treatment Symptoms/Complaints:  States the weather is causing her knees to hurt more today. Pain: Initial:   6/10  Post Session:  7/10   Aquatic Therapy (45 minutes): Aquatic treatment performed per flow grid for Decreased muscle strength, Decreased static/dynamic balance and reactive control, Decompression, Ease of movement and Low impact and reduced weight bearing activity. Cues provided for ex,gait and posture.       Aquatic Exercise Log       Date  10/16 Date  10/18 Date  10/25 Date  10/30 Date  11/1 Date  11/6/17   Activity/ Exercise Parameters Parameters Parameters Parameters Parameters    Walking forward 6 6 6 6 6 6   Walking backward 6 6 6 6 6 6   Walking sideways 6 6 6 6 6 6     Marching 6 6 6 6 6 6     Goose Step 6 6 6 6 6 6     Tip toes 3 3 3 3 3 3     Heels 3 3 3 3 3 3     Lunges         Side step squats         LE Exercises     2# 2#     Hip Flex/Ext 10 10 15 15 15 15     Hip Abd/Add 10 10 15 15 15 15     Hip IR/ER           Calf raises 10 10 15 15 15 15     Knee Flex 10 10 15 15 15 15     Squats           Leg Circles 10/10 10/10 10/10 10/10 10/10 15/15     Step Ups 10 10 10 10 10 15   UE Exercises           Squeeze In           Push Down           Pull Down           Bicep/Tricep         Rows/Press outs          Chi Positions           Trunk Rotation         Deep H2O/ Noodles 7' with noodle and assist 7' with noodle and assist 7' with 2# and noodle and assist 7' with 2# and noodle assist 7' with 2 lbs 7' with 2#     Stabilization           Arms only           Legs only Jog 2 min Jog 2 min Jog 2 min Jog 2 min Jog 2 min Jog 2 min   Cross   Country 2 min 2 min 2 min 2 min 2 min 2 min     Scissors 1 min 1 min 2 min 2 min 2 min 2 min     Crab walk         Lower abdominal   work            Cardio           Jogging         Lap   Swimming           Stretches           Hamstrings           Heelcords           quads  5 x 10 sec 5 x 10 sec 5 x 10 sec 5 x 10 sec 5 x 10 sec     Neck             Treatment/Session Assessment:    · Response to Treatment: no change in reported symptoms. · Compliance with Program/Exercises: Will assess as treatment progresses. · Recommendations/Intent for next treatment session: \"Next visit will focus on aquatic therapy\".   Total Treatment Duration: 45 min  PT Patient Time In/Time Out  Time In: 1230  Time Out: 5146    Camilo Nageotte, PT

## 2017-11-08 ENCOUNTER — HOSPITAL ENCOUNTER (OUTPATIENT)
Dept: PHYSICAL THERAPY | Age: 57
Discharge: HOME | End: 2017-11-08
Payer: COMMERCIAL

## 2017-11-08 PROCEDURE — 97113 AQUATIC THERAPY/EXERCISES: CPT

## 2017-11-08 NOTE — PROGRESS NOTES
Bill Hogan  : 1960  Payor: Chasity Zhu / [place] DARCY / Product Type: DARCY /  [address]  at [facility] OSIR[facility] ZIMMER  [address] [facility], [address] [place] [address] [address], [address]  [phone]   [phone]       OUTPATIENT PHYSICAL [address] Note 2017    ICD-10: Treatment Diagnosis: (M25.562) Pain in left knee  (M25.561) Pain in right knee  Precautions/Allergies:   Codeine   Fall Risk Score: 0 (? 5 = High Risk)  MD Orders: eval and treat aquatic therapy preferrable MEDICAL/REFERRING DIAGNOSIS:  Pain in left knee [M25.562]  Pain in right knee [M25.561]   DATE OF ONSET: L knee 1 yr ago,  R knee chronic  REFERRING PHYSICIAN: Sami Gutierrez DO  RETURN PHYSICIAN APPOINTMENT: 17     INITIAL ASSESSMENT:  Ms. Beatris Rios presents with B knee pain which s   PROBLEM LIST (Impacting functional limitations):  1. Decreased Strength  2. Decreased Ambulation Ability/Technique  3. Increased Pain  4. Decreased Activity Tolerance INTERVENTIONS PLANNED:  1. Home Exercise Program (HEP)  2. Therapeutic Exercise/Strengthening  3. aquatic therapy   TREATMENT PLAN:  Effective Dates: 10/10/17 TO 1/10/17. Frequency/Duration: 2 times a week for 8 weeks  GOALS: (Goals have been discussed and agreed upon with patient.)  Short-Term Functional Goals: Time Frame: 2 weeks  1. Pt to perform 45 minutes of aquatic therapy consistently. 2. Pt independent in HEP to address deficits. 3. Pt to report a decrease in symptoms  Discharge Goals: Time Frame: 8 weeks  1. Increase in LEFS score by 10 points indicating improved function. 2. Increase in strength of HS and quads to improve stamina for gait activities. 3. Pt independent in HEP to maintain gains made in PT  4. Pt to report pain 3/10 or less consistently.   Rehabilitation Potential For Stated Goals: [address] [employer] therapy, I certify that the treatment plan above will be carried out by a therapist or under their direction. Thank you for this referral,  Elizabeth Sorenson, PT     Referring Physician Signature: Sami Gutierrez,               Date                    The information in this section was collected on 10/10/17 (except where otherwise noted). HISTORY:   History of Present Injury/Illness (Reason for Referral):  B knee pain worse in the morning. Increased with housework, driving long distances  Describes sharp tingling from knee to big toe bilaterally. Had an MRI of R knee- which showed a small cartilage tear. Pt states the more she does the more her knees hurt. Rates pain 5/10 worst -9/10 worst, 3/10 best.    Past Medical History/Comorbidities:   Previously had shingles which affected her L knee area, IBS,   Social History/Living Environment:    lives with . Stairs at home. Prior Level of Function/Work/Activity:    Other Clinical Tests:          MRI, X ray had fluid removed, cortisone shots were not helpful  Personal Factors: Other factors that influence how disability is experienced by the patient:  Pt's  is on disability from his job. Pt has not worked in 2 yrs as she was laid off. Current Medications:       Current Outpatient Prescriptions:     ALPRAZolam (XANAX) 0.5 mg tablet, One tablet 30 minutes prior to procedure., Disp: 4 Tab, Rfl: 0    celecoxib (CELEBREX) 200 mg capsule, TAKE 1 CAPSULE TWICE A DAY, Disp: , Rfl: 5    DULoxetine (CYMBALTA) 60 mg capsule, Take 1 Cap by mouth daily. , Disp: 30 Cap, Rfl: 12   Date Last Reviewed:  11/8/2017   Number of Personal Factors/Comorbidities that affect the Plan of Care: 3+: HIGH COMPLEXITY   EXAMINATION:   Observation/Orthostatic Postural Assessment:          L knee has minimal swelling  Palpation:          Tender to palpation medial and lateral joint line of B knees  ROM:          ROM WNL  Strength:          B HS weakness 4/5,            Body Structures Involved:  1. Joints  2.  Muscles Body Functions Affected:  1. Sensory/Pain  2. Neuromusculoskeletal Activities and Participation Affected:  1. Mobility  2. Domestic Life   Number of elements (examined above) that affect the Plan of Care: 4+: HIGH COMPLEXITY   CLINICAL PRESENTATION:   Presentation: Evolving clinical presentation with changing clinical characteristics: MODERATE COMPLEXITY   CLINICAL DECISION MAKING:   Outcome Measure: Tool Used: Lower Extremity Functional Scale (LEFS)  Score:  Initial: 21/80 Most Recent: X/80 (Date: -- )   Interpretation of Score: 20 questions each scored on a 5 point scale with 0 representing \"extreme difficulty or unable to perform\" and 4 representing \"no difficulty\". The lower the score, the greater the functional disability. 80/80 represents no disability. Minimal detectable change is 9 points. Score 80 79-63 62-48 47-32 31-16 15-1 0   Modifier CH CI CJ CK CL CM CN           Medical Necessity:   · Pt requires PT for rehabilitation and guidance for B knee pain affecting mobility. .  Reason for Services/Other Comments:  · Pt to begin aquatic therapy. .   Use of outcome tool(s) and clinical judgement create a POC that gives a: Questionable prediction of patient's progress: MODERATE COMPLEXITY            TREATMENT:   (In addition to Assessment/Re-Assessment sessions the following treatments were rendered)  Pre-treatment Symptoms/Complaints:  States her knees are just slightly better. Pain: Initial:   6/10  Post Session:  5/10   Aquatic Therapy (45 minutes): Aquatic treatment performed per flow grid for Decreased muscle strength, Decreased static/dynamic balance and reactive control, Decompression, Ease of movement and Low impact and reduced weight bearing activity. Cues provided for ex,gait and posture.       Aquatic Exercise Log       Date  11/9/17   Activity/ Exercise    Walking forward 6   Walking backward 6   Walking sideways 6     Marching 6     Goose Step 6     Tip toes 3     Heels 3     Lunges    Side step squats    LE Exercises 2#     Hip Flex/Ext 15     Hip Abd/Add 15     Hip IR/ER      Calf raises 15     Knee Flex 15     Squats      Leg Circles 15/15     Step Ups 15   UE Exercises      Squeeze In      Push Down      Pull Down      Bicep/Tricep    Rows/Press outs     Chi Positions      Trunk Rotation    Deep H2O/ Noodles 7' with 2#     Stabilization      Arms only      Legs only Jog 2 min   Cross   Country 2 min     Scissors 2 min     Crab walk    Lower abdominal   work       Cardio      Jogging    Lap   Swimming      Stretches      Hamstrings      Heelcords      quads 5 x 10 sec     Neck        Treatment/Session Assessment:    · Response to Treatment: Pt did well with exercises. · Compliance with Program/Exercises: Will assess as treatment progresses. · Recommendations/Intent for next treatment session: \"Next visit will focus on aquatic therapy\".   Total Treatment Duration: 45 min  PT Patient Time In/Time Out  Time In: 0230  Time Out: 0315    Edyta Gary, PT

## 2017-11-13 ENCOUNTER — HOSPITAL ENCOUNTER (OUTPATIENT)
Dept: PHYSICAL THERAPY | Age: 57
Discharge: HOME | End: 2017-11-13
Payer: COMMERCIAL

## 2017-11-15 ENCOUNTER — HOSPITAL ENCOUNTER (OUTPATIENT)
Dept: PHYSICAL THERAPY | Age: 57
End: 2017-11-15
Payer: COMMERCIAL

## 2017-11-20 ENCOUNTER — HOSPITAL ENCOUNTER (OUTPATIENT)
Dept: PHYSICAL THERAPY | Age: 57
End: 2017-11-20
Payer: COMMERCIAL

## 2017-11-28 ENCOUNTER — HOSPITAL ENCOUNTER (OUTPATIENT)
Dept: PHYSICAL THERAPY | Age: 57
Discharge: HOME | End: 2017-11-28
Payer: COMMERCIAL

## 2018-07-18 NOTE — PROGRESS NOTES
Lazarus Slippascual Yue Grow  : 1960  Payor: Aquiles Gallagher / [place] DARCY / Product Type: DARCY /  [address]  at [facility]  [address] [facility], [address] [place] [address] [address], [facility] [address]  [phone]   [phone]       OUTPATIENT PHYSICAL [address] Note 2017    ICD-10: Treatment Diagnosis: (M25.562) Pain in left knee  (M25.561) Pain in right knee  Precautions/Allergies:   Codeine   Fall Risk Score: 0 (? 5 = High Risk)  MD Orders: eval and treat aquatic therapy preferrable MEDICAL/REFERRING DIAGNOSIS:  Pain in left knee [M25.562]  Pain in right knee [M25.561]   DATE OF ONSET: L knee 1 yr ago,  R knee chronic  REFERRING PHYSICIAN: Sami Gutierrez DO  RETURN PHYSICIAN APPOINTMENT: 17     Pt did not attend therapy after 17 and will be discharged at this time.

## 2019-05-02 PROBLEM — Z98.890 HISTORY OF COLONOSCOPY: Status: ACTIVE | Noted: 2019-05-02

## 2019-05-04 ENCOUNTER — HOSPITAL ENCOUNTER (OUTPATIENT)
Dept: MAMMOGRAPHY | Age: 59
Discharge: HOME | End: 2019-05-04
Attending: FAMILY MEDICINE
Payer: COMMERCIAL

## 2019-05-04 DIAGNOSIS — Z12.39 BREAST SCREENING, UNSPECIFIED: ICD-10-CM

## 2019-05-04 PROCEDURE — 77067 SCR MAMMO BI INCL CAD: CPT

## 2019-05-16 ENCOUNTER — HOSPITAL ENCOUNTER (OUTPATIENT)
Dept: MAMMOGRAPHY | Age: 59
Discharge: HOME | End: 2019-05-16
Attending: FAMILY MEDICINE
Payer: COMMERCIAL

## 2019-05-16 DIAGNOSIS — R92.8 ABNORMAL FINDING ON MAMMOGRAPHY: ICD-10-CM

## 2019-05-16 PROCEDURE — 77065 DX MAMMO INCL CAD UNI: CPT

## 2019-05-16 PROCEDURE — 76642 ULTRASOUND BREAST LIMITED: CPT

## 2019-10-28 RX ORDER — SODIUM CHLORIDE 0.9 % (FLUSH) 0.9 %
5-40 SYRINGE (ML) INJECTION AS NEEDED
Status: CANCELLED | OUTPATIENT
Start: 2019-10-28

## 2019-10-28 RX ORDER — SODIUM CHLORIDE 0.9 % (FLUSH) 0.9 %
5-40 SYRINGE (ML) INJECTION EVERY 8 HOURS
Status: CANCELLED | OUTPATIENT
Start: 2019-10-28

## 2019-10-30 ENCOUNTER — ANESTHESIA EVENT (OUTPATIENT)
Dept: SURGERY | Age: 59
End: 2019-10-30
Payer: COMMERCIAL

## 2019-10-31 ENCOUNTER — HOSPITAL ENCOUNTER (OUTPATIENT)
Age: 59
Setting detail: OUTPATIENT SURGERY
Discharge: HOME | End: 2019-10-31
Attending: ORTHOPAEDIC SURGERY | Admitting: ORTHOPAEDIC SURGERY
Payer: COMMERCIAL

## 2019-10-31 ENCOUNTER — ANESTHESIA (OUTPATIENT)
Dept: SURGERY | Age: 59
End: 2019-10-31
Payer: COMMERCIAL

## 2019-10-31 VITALS
HEART RATE: 72 BPM | OXYGEN SATURATION: 99 % | DIASTOLIC BLOOD PRESSURE: 68 MMHG | WEIGHT: 125 LBS | TEMPERATURE: 97.2 F | RESPIRATION RATE: 15 BRPM | SYSTOLIC BLOOD PRESSURE: 133 MMHG | BODY MASS INDEX: 22.14 KG/M2

## 2019-10-31 PROCEDURE — 74011250636 HC RX REV CODE- 250/636: Performed by: ORTHOPAEDIC SURGERY

## 2019-10-31 PROCEDURE — 77030000032 HC CUF TRNQT ZIMM -B: Performed by: ORTHOPAEDIC SURGERY

## 2019-10-31 PROCEDURE — 76210000063 HC OR PH I REC FIRST 0.5 HR: Performed by: ORTHOPAEDIC SURGERY

## 2019-10-31 PROCEDURE — 74011250636 HC RX REV CODE- 250/636: Performed by: NURSE ANESTHETIST, CERTIFIED REGISTERED

## 2019-10-31 PROCEDURE — 77030029203 HC IRR KT CYSTO/TUR BBMI -A: Performed by: ORTHOPAEDIC SURGERY

## 2019-10-31 PROCEDURE — 76060000032 HC ANESTHESIA 0.5 TO 1 HR: Performed by: ORTHOPAEDIC SURGERY

## 2019-10-31 PROCEDURE — 76210000021 HC REC RM PH II 0.5 TO 1 HR: Performed by: ORTHOPAEDIC SURGERY

## 2019-10-31 PROCEDURE — 74011250636 HC RX REV CODE- 250/636: Performed by: ANESTHESIOLOGY

## 2019-10-31 PROCEDURE — 77030018836 HC SOL IRR NACL ICUM -A: Performed by: ORTHOPAEDIC SURGERY

## 2019-10-31 PROCEDURE — 77030038012 HC WND COBLATN S&N -F: Performed by: ORTHOPAEDIC SURGERY

## 2019-10-31 PROCEDURE — 74011000250 HC RX REV CODE- 250: Performed by: NURSE ANESTHETIST, CERTIFIED REGISTERED

## 2019-10-31 PROCEDURE — 76010000138 HC OR TIME 0.5 TO 1 HR: Performed by: ORTHOPAEDIC SURGERY

## 2019-10-31 PROCEDURE — 74011250637 HC RX REV CODE- 250/637: Performed by: ANESTHESIOLOGY

## 2019-10-31 PROCEDURE — 77030010509 HC AIRWY LMA MSK TELE -A: Performed by: ANESTHESIOLOGY

## 2019-10-31 PROCEDURE — 77030006668 HC BLD SHV MENSCS STRY -B: Performed by: ORTHOPAEDIC SURGERY

## 2019-10-31 RX ORDER — HYDROMORPHONE HYDROCHLORIDE 2 MG/ML
0.5 INJECTION, SOLUTION INTRAMUSCULAR; INTRAVENOUS; SUBCUTANEOUS
Status: DISCONTINUED | OUTPATIENT
Start: 2019-10-31 | End: 2019-10-31 | Stop reason: HOSPADM

## 2019-10-31 RX ORDER — PROPOFOL 10 MG/ML
INJECTION, EMULSION INTRAVENOUS AS NEEDED
Status: DISCONTINUED | OUTPATIENT
Start: 2019-10-31 | End: 2019-10-31 | Stop reason: HOSPADM

## 2019-10-31 RX ORDER — SODIUM CHLORIDE, SODIUM LACTATE, POTASSIUM CHLORIDE, CALCIUM CHLORIDE 600; 310; 30; 20 MG/100ML; MG/100ML; MG/100ML; MG/100ML
75 INJECTION, SOLUTION INTRAVENOUS CONTINUOUS
Status: DISCONTINUED | OUTPATIENT
Start: 2019-10-31 | End: 2019-10-31 | Stop reason: HOSPADM

## 2019-10-31 RX ORDER — ONDANSETRON 2 MG/ML
INJECTION INTRAMUSCULAR; INTRAVENOUS AS NEEDED
Status: DISCONTINUED | OUTPATIENT
Start: 2019-10-31 | End: 2019-10-31 | Stop reason: HOSPADM

## 2019-10-31 RX ORDER — SODIUM CHLORIDE 0.9 % (FLUSH) 0.9 %
5-40 SYRINGE (ML) INJECTION EVERY 8 HOURS
Status: DISCONTINUED | OUTPATIENT
Start: 2019-10-31 | End: 2019-10-31 | Stop reason: HOSPADM

## 2019-10-31 RX ORDER — SODIUM CHLORIDE 0.9 % (FLUSH) 0.9 %
5-40 SYRINGE (ML) INJECTION AS NEEDED
Status: DISCONTINUED | OUTPATIENT
Start: 2019-10-31 | End: 2019-10-31 | Stop reason: HOSPADM

## 2019-10-31 RX ORDER — ACETAMINOPHEN 10 MG/ML
1000 INJECTION, SOLUTION INTRAVENOUS ONCE
Status: COMPLETED | OUTPATIENT
Start: 2019-10-31 | End: 2019-10-31

## 2019-10-31 RX ORDER — OXYCODONE AND ACETAMINOPHEN 10; 325 MG/1; MG/1
1 TABLET ORAL AS NEEDED
Status: DISCONTINUED | OUTPATIENT
Start: 2019-10-31 | End: 2019-10-31 | Stop reason: HOSPADM

## 2019-10-31 RX ORDER — DEXAMETHASONE SODIUM PHOSPHATE 4 MG/ML
INJECTION, SOLUTION INTRA-ARTICULAR; INTRALESIONAL; INTRAMUSCULAR; INTRAVENOUS; SOFT TISSUE AS NEEDED
Status: DISCONTINUED | OUTPATIENT
Start: 2019-10-31 | End: 2019-10-31 | Stop reason: HOSPADM

## 2019-10-31 RX ORDER — MIDAZOLAM HYDROCHLORIDE 1 MG/ML
2 INJECTION, SOLUTION INTRAMUSCULAR; INTRAVENOUS
Status: COMPLETED | OUTPATIENT
Start: 2019-10-31 | End: 2019-10-31

## 2019-10-31 RX ORDER — ROPIVACAINE HYDROCHLORIDE 5 MG/ML
INJECTION, SOLUTION EPIDURAL; INFILTRATION; PERINEURAL AS NEEDED
Status: DISCONTINUED | OUTPATIENT
Start: 2019-10-31 | End: 2019-10-31 | Stop reason: HOSPADM

## 2019-10-31 RX ORDER — OXYCODONE HYDROCHLORIDE 5 MG/1
5 TABLET ORAL
Status: COMPLETED | OUTPATIENT
Start: 2019-10-31 | End: 2019-10-31

## 2019-10-31 RX ORDER — LIDOCAINE HYDROCHLORIDE 20 MG/ML
INJECTION, SOLUTION EPIDURAL; INFILTRATION; INTRACAUDAL; PERINEURAL AS NEEDED
Status: DISCONTINUED | OUTPATIENT
Start: 2019-10-31 | End: 2019-10-31 | Stop reason: HOSPADM

## 2019-10-31 RX ADMIN — PROPOFOL 50 MG: 10 INJECTION, EMULSION INTRAVENOUS at 13:03

## 2019-10-31 RX ADMIN — ACETAMINOPHEN 1000 MG: 10 INJECTION, SOLUTION INTRAVENOUS at 13:16

## 2019-10-31 RX ADMIN — MIDAZOLAM 2 MG: 1 INJECTION INTRAMUSCULAR; INTRAVENOUS at 12:22

## 2019-10-31 RX ADMIN — DEXAMETHASONE SODIUM PHOSPHATE 4 MG: 4 INJECTION, SOLUTION INTRAMUSCULAR; INTRAVENOUS at 13:17

## 2019-10-31 RX ADMIN — SODIUM CHLORIDE, SODIUM LACTATE, POTASSIUM CHLORIDE, AND CALCIUM CHLORIDE 75 ML/HR: 600; 310; 30; 20 INJECTION, SOLUTION INTRAVENOUS at 11:16

## 2019-10-31 RX ADMIN — OXYCODONE HYDROCHLORIDE 5 MG: 5 TABLET ORAL at 13:59

## 2019-10-31 RX ADMIN — PROPOFOL 150 MG: 10 INJECTION, EMULSION INTRAVENOUS at 13:02

## 2019-10-31 RX ADMIN — LIDOCAINE HYDROCHLORIDE 100 MG: 20 INJECTION, SOLUTION EPIDURAL; INFILTRATION; INTRACAUDAL; PERINEURAL at 13:02

## 2019-10-31 RX ADMIN — SODIUM CHLORIDE, SODIUM LACTATE, POTASSIUM CHLORIDE, AND CALCIUM CHLORIDE: 600; 310; 30; 20 INJECTION, SOLUTION INTRAVENOUS at 13:21

## 2019-10-31 RX ADMIN — ONDANSETRON 4 MG: 2 INJECTION INTRAMUSCULAR; INTRAVENOUS at 13:18

## 2019-10-31 NOTE — ANESTHESIA POSTPROCEDURE EVALUATION
Procedure(s):  RIGHT KNEE ARTHROSCOPY WITH ABRASION ARTHROPLASTY.     general    Anesthesia Post Evaluation      Multimodal analgesia: multimodal analgesia used between 6 hours prior to anesthesia start to PACU discharge  Patient location during evaluation: PACU  Patient participation: complete - patient participated  Level of consciousness: awake  Pain management: adequate  Airway patency: patent  Anesthetic complications: no  Cardiovascular status: acceptable  Respiratory status: acceptable  Hydration status: acceptable  Post anesthesia nausea and vomiting:  none      Vitals Value Taken Time   /65 10/31/2019  2:03 PM   Temp     Pulse 70 10/31/2019  2:03 PM   Resp 13 10/31/2019  1:58 PM   SpO2 100 % 10/31/2019  2:03 PM

## 2019-10-31 NOTE — BRIEF OP NOTE
BRIEF OPERATIVE NOTE    Date of Procedure: 10/31/2019   Preoperative Diagnosis: Degenerative tear of medial meniscus of right knee [M23.203]  Unilateral primary osteoarthritis, right knee [M17.11]  Postoperative Diagnosis: Right Knee Chrondromalacia    Procedure(s):  RIGHT KNEE ARTHROSCOPY WITH ABRASION ARTHROPLASTY             * Harvey Roblero MD - Primary         Surgical Assistant:       Surgical Staff:  Circ-1: Severa Ree, RN  Scrub Tech-1: Faustino Kate  Event Time In Time Out   Incision Start 1311    Incision Close 1323      Anesthesia: General   Estimated Blood Loss:     Specimens: * No specimens in log *   Findings:      Complications:       Implants: * No implants in log *

## 2019-10-31 NOTE — ANESTHESIA PREPROCEDURE EVALUATION
Relevant Problems   No relevant active problems       Anesthetic History   No history of anesthetic complications            Review of Systems / Medical History  Patient summary reviewed and pertinent labs reviewed    Pulmonary  Within defined limits                 Neuro/Psych         Psychiatric history     Cardiovascular                  Exercise tolerance: >4 METS     GI/Hepatic/Renal  Within defined limits              Endo/Other        Arthritis     Other Findings              Physical Exam    Airway  Mallampati: II  TM Distance: > 6 cm  Neck ROM: normal range of motion   Mouth opening: Normal     Cardiovascular  Regular rate and rhythm,  S1 and S2 normal,  no murmur, click, rub, or gallop  Rhythm: regular           Dental  No notable dental hx       Pulmonary                 Abdominal  GI exam deferred       Other Findings            Anesthetic Plan    ASA: 2  Anesthesia type: general          Induction: Intravenous  Anesthetic plan and risks discussed with: Patient

## 2019-10-31 NOTE — OP NOTES
[address] [facility]  OPERATIVE REPORT    Name:  Jacki Schmidt  MR#:  [id number]  :  1960  ACCOUNT #:  [de-identified]  DATE OF SERVICE:  10/31/2019    PREOPERATIVE DIAGNOSIS:  Possible meniscal tear of the right knee. POSTOPERATIVE DIAGNOSIS:  Chondromalacia of the right knee with small tear of the posterior horn of the medial meniscus. PROCEDURE PERFORMED:  1. Arthroscopic abrasion arthroplasty of small area of grade III chondromalacia of the undersurface of patella, 60083.  2.  Partial medial meniscectomy of the posterior horn, medial meniscus, 59471. SURGEON:  April Suarez MD    ASSISTANT:  None. ANESTHESIA:  General.    COMPLICATIONS:  None. SPECIMENS REMOVED:  None. IMPLANTS:  None. ESTIMATED BLOOD LOSS:  Minimal.    PROCEDURE:  After an adequate level of general anesthesia was obtained, the patient's right knee was prepped and draped in the usual sterile fashion. A tourniquet was used for the procedure. Photos made for the patient. Anteromedial and anterolateral portals made. The patellofemoral joint looked good. There was just some mild chondromalacia involving the femoral sulcus, just small less than dime-sized area of grade II with a small area of grade III chondromalacia of the undersurface of patella and abrasion arthroplasty to bleeding bone, but this was not a large area by any means. The ACL and PCL intact. The lateral compartment looked fine. The lateral meniscus was stressed and probed and stable. In the medial compartment, the patient just had a small tear of the posterior horn of the medial meniscus. The rest of meniscus was probed and was very stable. The arthroscope was placed in the notch to make sure there was no other peripheral component and overall looked good. There were no undersurface tears that were unstable. There was some mild grade II chondromalacia about the size of a quarter in the medial femoral condyle.   There was no eburnated bone and some of the delaminating flaps were stabilized with radiofrequency. The knee was then copiously irrigated and sterile dressings applied. She did not have any significant degenerative changes in the knee. She tolerated the procedure well.       Charles Montelongo MD      [id number]  D:  10/31/2019 13:28  T:  10/31/2019 14:32  JOB #:  [id number]  CC:  [facility]

## 2019-10-31 NOTE — DISCHARGE INSTRUCTIONS
POST OPERATIVE INSTRUCTIONS FOR KNEE ARTHROSCOPY    1. Unless you receive other instructions, you may weight bear as tolerated      2. Apply ice to your knee for 20-30 minutes several times per day for the first 3 days and then as needed. Icing will decrease the amount of inflammation and is helpful after exercise    3. You may remove the dressings the following day and apply waterproof band aids. Some bleeding and drainage may persist for several days following  surgery. Waterproof band aids may be used while showering and avoid tub baths for two weeks. 4. You will be given pain medications and do not drive under the influence. Some patients take pain medication at night and antiinflammatory medication during day  when pain decreases. 5. You may be given Phenergan for nausea    6. You will receive a phone call from our office notifying you of your follow up visit    7. If any problems call [phone]    ACTIVITY  · As tolerated and as directed by your doctor. · Bathe or shower as directed by your doctor. DIET  · Clear liquids until no nausea or vomiting; then light diet for the first day. · Advance to regular diet on second day, unless your doctor orders otherwise. · If nausea and vomiting continues, call your doctor. PAIN  · Take pain medication as directed by your doctor. · Call your doctor if pain is NOT relieved by medication. · DO NOT take aspirin of blood thinners unless directed by your doctor. DRESSING CARE       CALL YOUR DOCTOR IF   · Excessive bleeding that does not stop after holding pressure over the area  · Temperature of 101 degrees F or above  · Excessive redness, swelling or bruising, and/ or green or yellow, smelly discharge from incision    AFTER ANESTHESIA   · For the first 24 hours: DO NOT Drive, Drink alcoholic beverages, or Make important decisions. · Be aware of dizziness following anesthesia and while taking pain medication.      APPOINTMENT DATE/ TIME    YOUR DOCTOR'S PHONE NUMBER       DISCHARGE SUMMARY from Nurse    PATIENT INSTRUCTIONS:    After general anesthesia or intravenous sedation, for 24 hours or while taking prescription Narcotics:  · Limit your activities  · Do not drive and operate hazardous machinery  · Do not make important personal or business decisions  · Do  not drink alcoholic beverages  · If you have not urinated within 8 hours after discharge, please contact your surgeon on call. *  Please give a list of your current medications to your Primary Care Provider. *  Please update this list whenever your medications are discontinued, doses are      changed, or new medications (including over-the-counter products) are added. *  Please carry medication information at all times in case of emergency situations. These are general instructions for a healthy lifestyle:    No smoking/ No tobacco products/ Avoid exposure to second hand smoke    Surgeon General's Warning:  Quitting smoking now greatly reduces serious risk to your health. Obesity, smoking, and sedentary lifestyle greatly increases your risk for illness    A healthy diet, regular physical exercise & weight monitoring are important for maintaining a healthy lifestyle    You may be retaining fluid if you have a history of heart failure or if you experience any of the following symptoms:  Weight gain of 3 pounds or more overnight or 5 pounds in a week, increased swelling in our hands or feet or shortness of breath while lying flat in bed. Please call your doctor as soon as you notice any of these symptoms; do not wait until your next office visit. Recognize signs and symptoms of STROKE:    F-face looks uneven    A-arms unable to move or move unevenly    S-speech slurred or non-existent    T-time-call 911 as soon as signs and symptoms begin-DO NOT go       Back to bed or wait to see if you get better-TIME IS BRAIN.

## 2019-10-31 NOTE — H&P
Outpatient Surgery History and Physical:  Yessenia Phipps was seen and examined. CHIEF COMPLAINT:    r knee . PE:     Visit Vitals  /81 (BP 1 Location: Right arm, BP Patient Position: Sitting)   Pulse 92   Temp 98.1 °F (36.7 °C)   Resp 18   Wt 56.7 kg (125 lb)   SpO2 96%   BMI 22.14 kg/m²       Heart:   Regular rhythm      Lungs:  Are clear      Past Medical History:    Patient Active Problem List    Diagnosis    History of colonoscopy     wnl 2011      Depression    Rheumatoid arthritis [facility])     [facility], Dr. Brittney Lema. Surgical History:   Past Surgical History:   Procedure Laterality Date    HX BREAST LUMPECTOMY Right     benign    HX ORTHOPAEDIC Left     thumb    HX TONSIL AND ADENOIDECTOMY         Social History: Patient  reports that she has never smoked. She has never used smokeless tobacco. She reports that she drank alcohol. She reports that she does not use drugs. Family History:   Family History   Problem Relation Age of Onset    Uterine Cancer Mother     Lung Disease Father         emphysema       Allergies: Reviewed per EMR  Allergies   Allergen Reactions    Codeine Palpitations       Medications:    No current facility-administered medications on file prior to encounter. Current Outpatient Medications on File Prior to Encounter   Medication Sig    ENBREL SURECLICK 50 mg/mL (1 mL) injection every seven (7) days.  DULoxetine (CYMBALTA) 60 mg capsule Take 1 Cap by mouth daily.  folic acid (FOLVITE) 1 mg tablet Take 3 Tabs by mouth daily.  hydroxychloroquine (PLAQUENIL) 200 mg tablet Take 400 mg by mouth. The surgery is planned for the  knee. History and physical has been reviewed. The patient has been examined. There have been no significant clinical changes since the completion of the originally dated History and Physical.  Patient identified by surgeon; surgical site was confirmed by patient and surgeon.       The patient is here today for outpatient surgery. I have examined the patient, no changes are noted in the patient's medical status. Necessity for the procedure/care is still present and the history and physical above is current. See the office notes for the full long term history of the problem. Please see the recent office notes for the musculoskeletal examination.     Signed By: Cass Armstrong MD     October 31, 2019 12:46 PM

## 2020-01-20 NOTE — PROGRESS NOTES
Georgia Mccain  : 1960  Payor: Aquiles Gallagher / Plan: [place] BLUE ESSENTIALS DARCY / Product Type: DARCY /  [address]  at [facility] OSIRIS ZIMMER  [facility], [address] [place] [address],[address], [address]  [phone]   [phone]       OUTPATIENT PHYSICAL [address] Note 10/25/2017    ICD-10: Treatment Diagnosis: (M25.562) Pain in left knee  (M25.561) Pain in right knee  Precautions/Allergies:   Codeine   Fall Risk Score: 0 (? 5 = High Risk)  MD Orders: eval and treat aquatic therapy preferrable MEDICAL/REFERRING DIAGNOSIS:  Pain in left knee [M25.562]  Pain in right knee [M25.561]   DATE OF ONSET: L knee 1 yr ago,  R knee chronic  REFERRING PHYSICIAN: Sami Gutierrez DO  RETURN PHYSICIAN APPOINTMENT: 17     INITIAL ASSESSMENT:  Ms. Antelmo Soliman presents with B knee pain which s   PROBLEM LIST (Impacting functional limitations):  1. Decreased Strength  2. Decreased Ambulation Ability/Technique  3. Increased Pain  4. Decreased Activity Tolerance INTERVENTIONS PLANNED:  1. Home Exercise Program (HEP)  2. Therapeutic Exercise/Strengthening  3. aquatic therapy   TREATMENT PLAN:  Effective Dates: 10/10/17 TO 1/10/17. Frequency/Duration: 2 times a week for 8 weeks  GOALS: (Goals have been discussed and agreed upon with patient.)  Short-Term Functional Goals: Time Frame: 2 weeks  1. Pt to perform 45 minutes of aquatic therapy consistently. 2. Pt independent in HEP to address deficits. 3. Pt to report a decrease in symptoms  Discharge Goals: Time Frame: 8 weeks  1. Increase in LEFS score by 10 points indicating improved function. 2. Increase in strength of HS and quads to improve stamina for gait activities. 3. Pt independent in HEP to maintain gains made in PT  4. Pt to report pain 3/10 or less consistently.   Rehabilitation Potential For Stated Goals: [address] [facility] therapy, I certify that the treatment plan above will be carried out by a therapist or under their direction. Thank you for this referral,  Tanya Haines, PT     Referring Physician Signature: Sami Gutierrez,               Date                    The information in this section was collected on 10/10/17 (except where otherwise noted). HISTORY:   History of Present Injury/Illness (Reason for Referral):  B knee pain worse in the morning. Increased with housework, driving long distances  Describes sharp tingling from knee to big toe bilaterally. Had an MRI of R knee- which showed a small cartilage tear. Pt states the more she does the more her knees hurt. Rates pain 5/10 worst -9/10 worst, 3/10 best.    Past Medical History/Comorbidities:   Previously had shingles which affected her L knee area, IBS,   Social History/Living Environment:    lives with . Stairs at home. Prior Level of Function/Work/Activity:    Other Clinical Tests:          MRI, X ray had fluid removed, cortisone shots were not helpful  Personal Factors: Other factors that influence how disability is experienced by the patient:  Pt's  is on disability from his job. Pt has not worked in 2 yrs as she was laid off. Current Medications:       Current Outpatient Prescriptions:     ALPRAZolam (XANAX) 0.5 mg tablet, One tablet 30 minutes prior to procedure., Disp: 4 Tab, Rfl: 0    celecoxib (CELEBREX) 200 mg capsule, TAKE 1 CAPSULE TWICE A DAY, Disp: , Rfl: 5    DULoxetine (CYMBALTA) 60 mg capsule, Take 1 Cap by mouth daily. , Disp: 30 Cap, Rfl: 12   Date Last Reviewed:  10/25/2017   Number of Personal Factors/Comorbidities that affect the Plan of Care: 3+: HIGH COMPLEXITY   EXAMINATION:   Observation/Orthostatic Postural Assessment:          L knee has minimal swelling  Palpation:          Tender to palpation medial and lateral joint line of B knees  ROM:          ROM WNL  Strength:          B HS weakness 4/5,            Body Structures Involved:  1. Joints  2.  Muscles Body Functions Affected:  1. Sensory/Pain  2. Neuromusculoskeletal Activities and Participation Affected:  1. Mobility  2. Domestic Life   Number of elements (examined above) that affect the Plan of Care: 4+: HIGH COMPLEXITY   CLINICAL PRESENTATION:   Presentation: Evolving clinical presentation with changing clinical characteristics: MODERATE COMPLEXITY   CLINICAL DECISION MAKING:   Outcome Measure: Tool Used: Lower Extremity Functional Scale (LEFS)  Score:  Initial: 21/80 Most Recent: X/80 (Date: -- )   Interpretation of Score: 20 questions each scored on a 5 point scale with 0 representing \"extreme difficulty or unable to perform\" and 4 representing \"no difficulty\". The lower the score, the greater the functional disability. 80/80 represents no disability. Minimal detectable change is 9 points. Score 80 79-63 62-48 47-32 31-16 15-1 0   Modifier CH CI CJ CK CL CM CN           Medical Necessity:   · Pt requires PT for rehabilitation and guidance for B knee pain affecting mobility. .  Reason for Services/Other Comments:  · Pt to begin aquatic therapy. .   Use of outcome tool(s) and clinical judgement create a POC that gives a: Questionable prediction of patient's progress: MODERATE COMPLEXITY            TREATMENT:   (In addition to Assessment/Re-Assessment sessions the following treatments were rendered)  Pre-treatment Symptoms/Complaints:  States she did not hurt quite as bad after last session as she did the first time. Pain: Initial:   8 or9/10  Post Session:  5/10   Aquatic Therapy (45 minutes): Aquatic treatment performed per flow grid for Decreased muscle strength, Decreased static/dynamic balance and reactive control, Decompression, Ease of movement and Low impact and reduced weight bearing activity. Cues provided for ex,gait and posture.       Aquatic Exercise Log       Date  10/16 Date  10/18 Date  10/25 Date   Date     Activity/ Exercise Parameters Parameters Parameters Parameters Parameters   Walking forward 6 6 6 Walking backward 6 6 6     Walking sideways 6 6 6       Marching 6 6 6       Goose Step 6 6 6       Tip toes 3 3 3       Heels 3 3 3       Lunges        Side step squats        LE Exercises          Hip Flex/Ext 10 10 15       Hip Abd/Add 10 10 15       Hip IR/ER          Calf raises 10 10 15       Knee Flex 10 10 15       Squats          Leg Circles 10/10 10/10 10/10       Step Ups 10 10 10     UE Exercises          Squeeze In          Push Down          Pull Down          Bicep/Tricep        Rows/Press outs         Chi Positions          Trunk Rotation        Deep H2O/ Noodles 7' with noodle and assist 7' with noodle and assist 7' with 2# and noodle and assist       Stabilization          Arms only          Legs only Jog 2 min Jog 2 min Jog 2 min     Cross   Country 2 min 2 min 2 min       Scissors 1 min 1 min 2 min       Crab walk        Lower abdominal   work           Cardio          Jogging        Lap   Swimming          Stretches          Hamstrings          Heelcords          quads  5 x 10 sec 5 x 10 sec       Neck            Treatment/Session Assessment:    · Response to Treatment: Pt did well with exercises. She reports high levels of pain upon entry but does well with exercises in the water. · Compliance with Program/Exercises: Will assess as treatment progresses. · Recommendations/Intent for next treatment session: \"Next visit will focus on aquatic therapy\".   Total Treatment Duration: 45 min       Evelyn Saldivar, PT single

## 2020-11-07 ENCOUNTER — APPOINTMENT (OUTPATIENT)
Dept: CT IMAGING | Age: 60
End: 2020-11-07
Attending: EMERGENCY MEDICINE
Payer: COMMERCIAL

## 2020-11-07 ENCOUNTER — HOSPITAL ENCOUNTER (EMERGENCY)
Age: 60
Discharge: HOME | End: 2020-11-07
Attending: EMERGENCY MEDICINE
Payer: COMMERCIAL

## 2020-11-07 VITALS
OXYGEN SATURATION: 99 % | SYSTOLIC BLOOD PRESSURE: 146 MMHG | DIASTOLIC BLOOD PRESSURE: 84 MMHG | WEIGHT: 118 LBS | HEART RATE: 95 BPM | RESPIRATION RATE: 18 BRPM | BODY MASS INDEX: 20.91 KG/M2 | TEMPERATURE: 98.7 F | HEIGHT: 63 IN

## 2020-11-07 DIAGNOSIS — R11.0 NAUSEA WITHOUT VOMITING: ICD-10-CM

## 2020-11-07 DIAGNOSIS — R10.84 ABDOMINAL PAIN, GENERALIZED: Primary | ICD-10-CM

## 2020-11-07 DIAGNOSIS — R19.7 DIARRHEA, UNSPECIFIED TYPE: ICD-10-CM

## 2020-11-07 LAB
ALBUMIN SERPL-MCNC: 3.9 G/DL (ref 3.2–4.6)
ALBUMIN/GLOB SERPL: 1.1 {RATIO} (ref 1.2–3.5)
ALP SERPL-CCNC: 58 U/L (ref 50–130)
ALT SERPL-CCNC: 24 U/L (ref 12–65)
ANION GAP SERPL CALC-SCNC: 7 MMOL/L (ref 7–16)
AST SERPL-CCNC: 19 U/L (ref 15–37)
BASOPHILS # BLD: 0 K/UL (ref 0–0.2)
BASOPHILS NFR BLD: 1 % (ref 0–2)
BILIRUB SERPL-MCNC: 0.4 MG/DL (ref 0.2–1.1)
BUN SERPL-MCNC: 8 MG/DL (ref 8–23)
CALCIUM SERPL-MCNC: 10 MG/DL (ref 8.3–10.4)
CHLORIDE SERPL-SCNC: 106 MMOL/L (ref 98–107)
CO2 SERPL-SCNC: 27 MMOL/L (ref 21–32)
CREAT SERPL-MCNC: 0.77 MG/DL (ref 0.6–1)
DIFFERENTIAL METHOD BLD: ABNORMAL
EOSINOPHIL # BLD: 0 K/UL (ref 0–0.8)
EOSINOPHIL NFR BLD: 1 % (ref 0.5–7.8)
ERYTHROCYTE [DISTWIDTH] IN BLOOD BY AUTOMATED COUNT: 13.4 % (ref 11.9–14.6)
GLOBULIN SER CALC-MCNC: 3.4 G/DL (ref 2.3–3.5)
GLUCOSE SERPL-MCNC: 93 MG/DL (ref 65–100)
HCT VFR BLD AUTO: 37.7 % (ref 35.8–46.3)
HGB BLD-MCNC: 12.6 G/DL (ref 11.7–15.4)
IMM GRANULOCYTES # BLD AUTO: 0 K/UL (ref 0–0.5)
IMM GRANULOCYTES NFR BLD AUTO: 0 % (ref 0–5)
LIPASE SERPL-CCNC: 147 U/L (ref 73–393)
LYMPHOCYTES # BLD: 2.6 K/UL (ref 0.5–4.6)
LYMPHOCYTES NFR BLD: 40 % (ref 13–44)
MCH RBC QN AUTO: 28.5 PG (ref 26.1–32.9)
MCHC RBC AUTO-ENTMCNC: 33.4 G/DL (ref 31.4–35)
MCV RBC AUTO: 85.3 FL (ref 79.6–97.8)
MONOCYTES # BLD: 0.4 K/UL (ref 0.1–1.3)
MONOCYTES NFR BLD: 6 % (ref 4–12)
NEUTS SEG # BLD: 3.4 K/UL (ref 1.7–8.2)
NEUTS SEG NFR BLD: 52 % (ref 43–78)
NRBC # BLD: 0 K/UL (ref 0–0.2)
PLATELET # BLD AUTO: 215 K/UL (ref 150–450)
PMV BLD AUTO: 8.6 FL (ref 9.4–12.3)
POTASSIUM SERPL-SCNC: 3.7 MMOL/L (ref 3.5–5.1)
PROT SERPL-MCNC: 7.3 G/DL (ref 6.3–8.2)
RBC # BLD AUTO: 4.42 M/UL (ref 4.05–5.2)
SODIUM SERPL-SCNC: 140 MMOL/L (ref 136–145)
WBC # BLD AUTO: 6.6 K/UL (ref 4.3–11.1)

## 2020-11-07 PROCEDURE — 96361 HYDRATE IV INFUSION ADD-ON: CPT

## 2020-11-07 PROCEDURE — 80053 COMPREHEN METABOLIC PANEL: CPT

## 2020-11-07 PROCEDURE — 74011000636 HC RX REV CODE- 636: Performed by: EMERGENCY MEDICINE

## 2020-11-07 PROCEDURE — 96374 THER/PROPH/DIAG INJ IV PUSH: CPT

## 2020-11-07 PROCEDURE — 74011250636 HC RX REV CODE- 250/636: Performed by: EMERGENCY MEDICINE

## 2020-11-07 PROCEDURE — 74011000258 HC RX REV CODE- 258: Performed by: EMERGENCY MEDICINE

## 2020-11-07 PROCEDURE — 83690 ASSAY OF LIPASE: CPT

## 2020-11-07 PROCEDURE — 74177 CT ABD & PELVIS W/CONTRAST: CPT

## 2020-11-07 PROCEDURE — 99283 EMERGENCY DEPT VISIT LOW MDM: CPT

## 2020-11-07 PROCEDURE — 85025 COMPLETE CBC W/AUTO DIFF WBC: CPT

## 2020-11-07 PROCEDURE — 99284 EMERGENCY DEPT VISIT MOD MDM: CPT

## 2020-11-07 RX ORDER — SODIUM CHLORIDE 0.9 % (FLUSH) 0.9 %
10 SYRINGE (ML) INJECTION
Status: COMPLETED | OUTPATIENT
Start: 2020-11-07 | End: 2020-11-07

## 2020-11-07 RX ORDER — HYOSCYAMINE SULFATE 0.12 MG/1
0.12 TABLET SUBLINGUAL
Qty: 12 TAB | Status: SHIPPED | OUTPATIENT
Start: 2020-11-07 | End: 2021-06-14 | Stop reason: ALTCHOICE

## 2020-11-07 RX ORDER — ONDANSETRON 4 MG/1
4 TABLET, ORALLY DISINTEGRATING ORAL
Qty: 6 TAB | Refills: 0 | Status: SHIPPED | OUTPATIENT
Start: 2020-11-07 | End: 2021-06-14 | Stop reason: ALTCHOICE

## 2020-11-07 RX ORDER — ONDANSETRON 2 MG/ML
4 INJECTION INTRAMUSCULAR; INTRAVENOUS
Status: COMPLETED | OUTPATIENT
Start: 2020-11-07 | End: 2020-11-07

## 2020-11-07 RX ORDER — SODIUM CHLORIDE 9 MG/ML
1000 INJECTION, SOLUTION INTRAVENOUS ONCE
Status: COMPLETED | OUTPATIENT
Start: 2020-11-07 | End: 2020-11-07

## 2020-11-07 RX ADMIN — SODIUM CHLORIDE 1000 ML: 900 INJECTION, SOLUTION INTRAVENOUS at 21:12

## 2020-11-07 RX ADMIN — ONDANSETRON 4 MG: 2 INJECTION INTRAMUSCULAR; INTRAVENOUS at 21:13

## 2020-11-07 RX ADMIN — SODIUM CHLORIDE 100 ML: 900 INJECTION, SOLUTION INTRAVENOUS at 22:37

## 2020-11-07 RX ADMIN — DIATRIZOATE MEGLUMINE AND DIATRIZOATE SODIUM 15 ML: 660; 100 LIQUID ORAL; RECTAL at 21:14

## 2020-11-07 RX ADMIN — IOPAMIDOL 100 ML: 755 INJECTION, SOLUTION INTRAVENOUS at 22:37

## 2020-11-07 RX ADMIN — Medication 10 ML: at 22:37

## 2020-11-08 NOTE — ED TRIAGE NOTES
Pt states she hasn't been able to eat or drink yesterday since yesterday, states she has not been vomiting but had diarrhea for a few days and felt like she was going to pass out earlier. States all of this is making her chronic pain worse. She states she did take a Miralax earlier today but no longer has the diarrhea.  Masked on arrival.

## 2020-11-08 NOTE — ED PROVIDER NOTES
The history is provided by the patient. Abdominal Pain    This is a new problem. The current episode started 2 days ago. The problem occurs daily. The problem has not changed since onset. Associated with: Nausea and diarrhea following laxative use for constipation induced by Norco. The pain is located in the generalized abdominal region. The quality of the pain is cramping. The pain is moderate. Associated symptoms include diarrhea, nausea and constipation. Pertinent negatives include no fever, no hematochezia, no melena, no vomiting, no dysuria, no frequency, no hematuria, no headaches, no myalgias, no chest pain and no back pain. Nothing worsens the pain. The pain is relieved by nothing.         Past Medical History:   Diagnosis Date    Anxiety     Claustrophobia     Depression     IBS (irritable bowel syndrome)     managed with diet    Menopause     Rheumatoid arthritis ([facility] [address]        Past Surgical History:   Procedure Laterality Date    HX BREAST LUMPECTOMY Right     benign    HX ORTHOPAEDIC Left     thumb    HX TONSIL AND ADENOIDECTOMY           Family History:   Problem Relation Age of Onset    Uterine Cancer Mother     Lung Disease Father         emphysema       Social History     Socioeconomic History    Marital status:      Spouse name: Not on file    Number of children: Not on file    Years of education: Not on file    Highest education level: Not on file   Occupational History    Not on file   Social Needs    Financial resource strain: Not on file    Food insecurity     Worry: Not on file     Inability: Not on file   [employer] needs     Medical: Not on file     Non-medical: Not on file   Tobacco Use    Smoking status: Never Smoker    Smokeless tobacco: Never Used   Substance and Sexual Activity    Alcohol use: Not Currently     Comment: rare     Drug use: No    Sexual activity: Not on file   Lifestyle    Physical activity     Days per week: Not on file     Minutes per session: Not on file    Stress: Not on file   Relationships    Social connections     Talks on phone: Not on file     Gets together: Not on file     Attends Zoroastrian service: Not on file     Active member of club or organization: Not on file     Attends meetings of clubs or organizations: Not on file     Relationship status: Not on file    Intimate partner violence     Fear of current or ex partner: Not on file     Emotionally abused: Not on file     Physically abused: Not on file     Forced sexual activity: Not on file   Other Topics Concern    Not on file   Social History Narrative    Not on file         ALLERGIES: Codeine    Review of Systems   Constitutional: Negative for chills and fever. HENT: Negative for congestion, rhinorrhea and sore throat. Eyes: Negative for photophobia and redness. Respiratory: Negative for cough and shortness of breath. Cardiovascular: Negative for chest pain and leg swelling. Gastrointestinal: Positive for abdominal pain, constipation, diarrhea and nausea. Negative for blood in stool, hematochezia, melena and vomiting. Endocrine: Negative for polydipsia and polyuria. Genitourinary: Negative for dysuria, frequency and hematuria. Musculoskeletal: Negative for back pain and myalgias. Neurological: Negative for weakness, numbness and headaches. Vitals:    11/07/20 1939   BP: (!) 174/105   Pulse: (!) 103   Resp: 18   Temp: 98.7 °F (37.1 °C)   SpO2: 96%   Weight: 53.5 kg (118 lb)   Height: 5' 3\" (1.6 m)            Physical Exam  Vitals signs and nursing note reviewed. Constitutional:       General: She is not in acute distress. Appearance: She is well-developed. HENT:      Head: Normocephalic. Eyes:      Pupils: Pupils are equal, round, and reactive to light. Cardiovascular:      Rate and Rhythm: Normal rate and regular rhythm. Heart sounds: Normal heart sounds.    Pulmonary:      Effort: Pulmonary effort is normal.      Breath sounds: Normal breath sounds. Abdominal:      General: There is no distension. Palpations: Abdomen is soft. There is no mass. Tenderness: There is generalized abdominal tenderness and tenderness in the suprapubic area, left upper quadrant and left lower quadrant. There is no guarding or rebound. Musculoskeletal: Normal range of motion. Lymphadenopathy:      Cervical: No cervical adenopathy. Skin:     General: Skin is warm and dry. Neurological:      Mental Status: She is alert. MDM  Number of Diagnoses or Management Options  Diagnosis management comments: With chronic pain and constipation due to Norco.  She took a laxative and then has been having diarrhea and nausea. She had abdominal tenderness but her CT scan is negative. She will be discharged home after IV hydration. Prescribe Zofran to help with her nausea. MiraLAX as needed for constipation in the future. Pain medicine per PCP.        Amount and/or Complexity of Data Reviewed  Clinical lab tests: ordered and reviewed (Results for orders placed or performed during the hospital encounter of 11/07/20  -CBC WITH AUTOMATED DIFF       Result                      Value             Ref Range           WBC                         6.6               4.3 - 11.1 K*       RBC                         4.42              4.05 - 5.2 M*       HGB                         12.6              11.7 - 15.4 *       HCT                         37.7              35.8 - 46.3 %       MCV                         85.3              79.6 - 97.8 *       MCH                         28.5              26.1 - 32.9 *       MCHC                        33.4              31.4 - 35.0 *       RDW                         13.4              11.9 - 14.6 %       PLATELET                    215               150 - 450 K/*       MPV                         8.6 (L)           9.4 - 12.3 FL       ABSOLUTE NRBC               0.00              0.0 - 0.2 K/*       DF AUTOMATED                             NEUTROPHILS                 52                43 - 78 %           LYMPHOCYTES                 40                13 - 44 %           MONOCYTES                   6                 4.0 - 12.0 %        EOSINOPHILS                 1                 0.5 - 7.8 %         BASOPHILS                   1                 0.0 - 2.0 %         IMMATURE GRANULOCYTES       0                 0.0 - 5.0 %         ABS. NEUTROPHILS            3.4               1.7 - 8.2 K/*       ABS. LYMPHOCYTES            2.6               0.5 - 4.6 K/*       ABS. MONOCYTES              0.4               0.1 - 1.3 K/*       ABS. EOSINOPHILS            0.0               0.0 - 0.8 K/*       ABS. BASOPHILS              0.0               0.0 - 0.2 K/*       ABS. IMM. GRANS.            0.0               0.0 - 0.5 K/*  -METABOLIC PANEL, COMPREHENSIVE       Result                      Value             Ref Range           Sodium                      140               136 - 145 mm*       Potassium                   3.7               3.5 - 5.1 mm*       Chloride                    106               98 - 107 mmo*       CO2                         27                21 - 32 mmol*       Anion gap                   7                 7 - 16 mmol/L       Glucose                     93                65 - 100 mg/*       BUN                         8                 8 - 23 MG/DL        Creatinine                  0.77              0.6 - 1.0 MG*       GFR est AA                  >60               >60 ml/min/1*       GFR est non-AA              >60               >60 ml/min/1*       Calcium                     10.0              8.3 - 10.4 M*       Bilirubin, total            0.4               0.2 - 1.1 MG*       ALT (SGPT)                  24                12 - 65 U/L         AST (SGOT)                  19                15 - 37 U/L         Alk.  phosphatase            58                50 - 130 U/L        Protein, total 7.3               6.3 - 8.2 g/*       Albumin                     3.9               3.2 - 4.6 g/*       Globulin                    3.4               2.3 - 3.5 g/*       A-G Ratio                   1.1 (L)           1.2 - 3.5      -LIPASE       Result                      Value             Ref Range           Lipase                      147               73 - 393 U/L   )  Tests in the radiology section of [facility]®: ordered and reviewed (Ct Abd Pelv W Cont    Result Date: 11/7/2020  CT ABDOMEN AND PELVIS WITH CONTRAST HISTORY: Abdominal pain and diarrhea COMPARISON: None TECHNIQUE: Helical imaging was performed from the lung bases through the ischial tuberosities during the intravenous infusion of 100 cc of Isovue-370. Oral contrast was administered. Coronal and sagittal reformats were performed. Dose reduction techniques used: Automated exposure control, adjustment of the mAs and/or kVp according to patient's size, and iterative reconstruction techniques. FINDINGS: *  LUNG BASES: Within normal limits. *  LIVER: Multiple simple cysts. *  GALLBLADDER AND BILE DUCTS: Normal. *  SPLEEN: Within normal limits. *  URINARY TRACT: Within normal limits. *  ADRENALS: Within normal limits. *  PANCREAS: Within normal limits. *  GASTROINTESTINAL TRACT: Sigmoid diverticulosis. *  RETROPERITONEUM: Within normal limits. *  PERITONEAL CAVITY AND ABDOMINAL WALL: Within normal limits. *  PELVIS: Within normal limits. *  SPINE / BONES: Within normal limits. *  OTHER COMMENTS: None. IMPRESSION: Multiple simple hepatic cysts. Sigmoid diverticulosis.  Date of Dictation: 11/7/2020 10:47 PM     )    Risk of Complications, Morbidity, and/or Mortality  Presenting problems: moderate  Diagnostic procedures: moderate  Management options: low           Procedures

## 2020-11-08 NOTE — ED NOTES
I have reviewed discharge instructions with the patient. The patient verbalized understanding. Patient left ED via Discharge Method: ambulatory to Home with her family. Opportunity for questions and clarification provided. Patient given 2 scripts. To continue your aftercare when you leave the hospital, you may receive an automated call from our care team to check in on how you are doing. This is a free service and part of our promise to provide the best care and service to meet your aftercare needs.  If you have questions, or wish to unsubscribe from this service please call [phone]. Thank you for Choosing our [facility].

## 2022-01-30 ENCOUNTER — APPOINTMENT (OUTPATIENT)
Dept: CT IMAGING | Age: 62
End: 2022-01-30
Attending: PHYSICIAN ASSISTANT
Payer: COMMERCIAL

## 2022-01-30 ENCOUNTER — HOSPITAL ENCOUNTER (EMERGENCY)
Age: 62
Discharge: HOME | End: 2022-01-30
Attending: EMERGENCY MEDICINE
Payer: COMMERCIAL

## 2022-01-30 ENCOUNTER — APPOINTMENT (OUTPATIENT)
Dept: GENERAL RADIOLOGY | Age: 62
End: 2022-01-30
Attending: PHYSICIAN ASSISTANT
Payer: COMMERCIAL

## 2022-01-30 VITALS
HEART RATE: 84 BPM | OXYGEN SATURATION: 96 % | HEIGHT: 63 IN | WEIGHT: 120 LBS | SYSTOLIC BLOOD PRESSURE: 144 MMHG | TEMPERATURE: 97.9 F | RESPIRATION RATE: 20 BRPM | DIASTOLIC BLOOD PRESSURE: 83 MMHG | BODY MASS INDEX: 21.26 KG/M2

## 2022-01-30 DIAGNOSIS — S09.90XA INJURY OF HEAD, INITIAL ENCOUNTER: ICD-10-CM

## 2022-01-30 DIAGNOSIS — U07.1 COVID-19: ICD-10-CM

## 2022-01-30 DIAGNOSIS — R55 VASOVAGAL SYNCOPE: Primary | ICD-10-CM

## 2022-01-30 LAB
ALBUMIN SERPL-MCNC: 3.5 G/DL (ref 3.2–4.6)
ALBUMIN/GLOB SERPL: 1.1 {RATIO} (ref 1.2–3.5)
ALP SERPL-CCNC: 53 U/L (ref 50–136)
ALT SERPL-CCNC: 18 U/L (ref 12–65)
ANION GAP SERPL CALC-SCNC: 5 MMOL/L (ref 7–16)
AST SERPL-CCNC: 23 U/L (ref 15–37)
ATRIAL RATE: 66 BPM
BACTERIA URNS QL MICRO: 0 /HPF
BASOPHILS # BLD: 0 K/UL (ref 0–0.2)
BASOPHILS NFR BLD: 0 % (ref 0–2)
BILIRUB SERPL-MCNC: 0.4 MG/DL (ref 0.2–1.1)
BUN SERPL-MCNC: 11 MG/DL (ref 8–23)
CALCIUM SERPL-MCNC: 9.5 MG/DL (ref 8.3–10.4)
CALCULATED P AXIS, ECG09: 65 DEGREES
CALCULATED R AXIS, ECG10: 63 DEGREES
CALCULATED T AXIS, ECG11: 64 DEGREES
CASTS URNS QL MICRO: NORMAL /LPF
CHLORIDE SERPL-SCNC: 109 MMOL/L (ref 98–107)
CO2 SERPL-SCNC: 26 MMOL/L (ref 21–32)
COVID-19 RAPID TEST, COVR: DETECTED
CREAT SERPL-MCNC: 0.82 MG/DL (ref 0.6–1)
CRYSTALS URNS QL MICRO: 0 /LPF
DIAGNOSIS, 93000: NORMAL
DIFFERENTIAL METHOD BLD: ABNORMAL
EOSINOPHIL # BLD: 0.1 K/UL (ref 0–0.8)
EOSINOPHIL NFR BLD: 1 % (ref 0.5–7.8)
EPI CELLS #/AREA URNS HPF: NORMAL /HPF
ERYTHROCYTE [DISTWIDTH] IN BLOOD BY AUTOMATED COUNT: 14 % (ref 11.9–14.6)
GLOBULIN SER CALC-MCNC: 3.3 G/DL
GLUCOSE SERPL-MCNC: 104 MG/DL (ref 65–100)
HCT VFR BLD AUTO: 36.7 % (ref 35.8–46.3)
HGB BLD-MCNC: 12.3 G/DL (ref 11.7–15.4)
IMM GRANULOCYTES # BLD AUTO: 0 K/UL (ref 0–0.5)
IMM GRANULOCYTES NFR BLD AUTO: 0 % (ref 0–5)
LYMPHOCYTES # BLD: 3.2 K/UL (ref 0.5–4.6)
LYMPHOCYTES NFR BLD: 57 % (ref 13–44)
MCH RBC QN AUTO: 28.7 PG (ref 26.1–32.9)
MCHC RBC AUTO-ENTMCNC: 33.5 G/DL (ref 31.4–35)
MCV RBC AUTO: 85.5 FL (ref 79.6–97.8)
MONOCYTES # BLD: 0.3 K/UL (ref 0.1–1.3)
MONOCYTES NFR BLD: 5 % (ref 4–12)
MUCOUS THREADS URNS QL MICRO: NORMAL /LPF
NEUTS SEG # BLD: 2.1 K/UL (ref 1.7–8.2)
NEUTS SEG NFR BLD: 37 % (ref 43–78)
NRBC # BLD: 0 K/UL (ref 0–0.2)
OTHER OBSERVATIONS,UCOM: NORMAL
P-R INTERVAL, ECG05: 166 MS
PLATELET # BLD AUTO: 259 K/UL (ref 150–450)
PMV BLD AUTO: 8.9 FL (ref 9.4–12.3)
POTASSIUM SERPL-SCNC: 3.7 MMOL/L (ref 3.5–5.1)
PROT SERPL-MCNC: 6.8 G/DL (ref 6.3–8.2)
Q-T INTERVAL, ECG07: 402 MS
QRS DURATION, ECG06: 74 MS
QTC CALCULATION (BEZET), ECG08: 421 MS
RBC # BLD AUTO: 4.29 M/UL (ref 4.05–5.2)
RBC #/AREA URNS HPF: NORMAL /HPF
SODIUM SERPL-SCNC: 140 MMOL/L (ref 136–145)
SOURCE, COVRS: ABNORMAL
VENTRICULAR RATE, ECG03: 66 BPM
WBC # BLD AUTO: 5.7 K/UL (ref 4.3–11.1)
WBC URNS QL MICRO: NORMAL /HPF

## 2022-01-30 PROCEDURE — 71045 X-RAY EXAM CHEST 1 VIEW: CPT

## 2022-01-30 PROCEDURE — 99284 EMERGENCY DEPT VISIT MOD MDM: CPT

## 2022-01-30 PROCEDURE — 73030 X-RAY EXAM OF SHOULDER: CPT

## 2022-01-30 PROCEDURE — 81015 MICROSCOPIC EXAM OF URINE: CPT

## 2022-01-30 PROCEDURE — 80053 COMPREHEN METABOLIC PANEL: CPT

## 2022-01-30 PROCEDURE — 85025 COMPLETE CBC W/AUTO DIFF WBC: CPT

## 2022-01-30 PROCEDURE — 81003 URINALYSIS AUTO W/O SCOPE: CPT

## 2022-01-30 PROCEDURE — 96360 HYDRATION IV INFUSION INIT: CPT

## 2022-01-30 PROCEDURE — 96361 HYDRATE IV INFUSION ADD-ON: CPT

## 2022-01-30 PROCEDURE — 93005 ELECTROCARDIOGRAM TRACING: CPT | Performed by: PHYSICIAN ASSISTANT

## 2022-01-30 PROCEDURE — 70450 CT HEAD/BRAIN W/O DYE: CPT

## 2022-01-30 PROCEDURE — 74011250637 HC RX REV CODE- 250/637: Performed by: PHYSICIAN ASSISTANT

## 2022-01-30 PROCEDURE — 74011250636 HC RX REV CODE- 250/636: Performed by: PHYSICIAN ASSISTANT

## 2022-01-30 PROCEDURE — 72125 CT NECK SPINE W/O DYE: CPT

## 2022-01-30 PROCEDURE — 87635 SARS-COV-2 COVID-19 AMP PRB: CPT

## 2022-01-30 RX ORDER — ACETAMINOPHEN 325 MG/1
650 TABLET ORAL
Status: COMPLETED | OUTPATIENT
Start: 2022-01-30 | End: 2022-01-30

## 2022-01-30 RX ADMIN — SODIUM CHLORIDE 1000 ML: 900 INJECTION, SOLUTION INTRAVENOUS at 13:25

## 2022-01-30 RX ADMIN — ACETAMINOPHEN 650 MG: 325 TABLET, FILM COATED ORAL at 13:26

## 2022-01-30 NOTE — DISCHARGE INSTRUCTIONS
Make sure to drink plenty of fluids and follow up with your doctor in 3 days for re-evaluation of symptoms.

## 2022-01-30 NOTE — ED TRIAGE NOTES
Pt had syncopal episode about 10:45. Pt syncopal only short period of time. Pt also complains of right shoulder pain.

## 2022-01-30 NOTE — ED NOTES
I have reviewed discharge instructions with the patient. The patient verbalized understanding. Patient left ED via Discharge Method: ambulatory to Home with son. Opportunity for questions and clarification provided. No acute distress present      Patient given 0 scripts. To continue your aftercare when you leave the hospital, you may receive an automated call from our care team to check in on how you are doing. This is a free service and part of our promise to provide the best care and service to meet your aftercare needs.  If you have questions, or wish to unsubscribe from this service please call [phone]. Thank you for Choosing our [facility] Department.

## 2022-01-30 NOTE — ED PROVIDER NOTES
Is a 60-year-old female with PMHx of RA who presents via EMS for the complaint of syncopal episode and head injury. Patient states that she has been sick with a head cold for the past week but is feeling slightly improved today. Additionally she has had diarrhea for the past 3 days. She got up this morning has not had much to eat or drink but went to the kitchen to give herself her methotrexate injection she began to feel lightheaded/woozy and had witnessed syncopal episode falling to the ground hitting the back of her head on the floor with the loss of consciousness. She does not take any aspirin or blood thinners. Her son called 46 and they brought her in via EMS. She says she has a dull ache in the back of her head and pain in her neck as well right shoulder. She denies any chest pain or shortness of breath currently or prior to syncopal episode. She denies any abdominal pain. The history is provided by the patient. Syncope  Associated symptoms include headaches. Pertinent negatives include no chest pain, no abdominal pain and no shortness of breath.         Past Medical History:   Diagnosis Date    Anxiety     Claustrophobia     Depression     IBS (irritable bowel syndrome)     managed with diet    Menopause     Rheumatoid arthritis ([facility] [address]        Past Surgical History:   Procedure Laterality Date    HX BREAST LUMPECTOMY Right     benign    HX ORTHOPAEDIC Left     thumb    HX TONSIL AND ADENOIDECTOMY           Family History:   Problem Relation Age of Onset    Uterine Cancer Mother     Lung Disease Father         emphysema       Social History     Socioeconomic History    Marital status:      Spouse name: Not on file    Number of children: Not on file    Years of education: Not on file    Highest education level: Not on file   Occupational History    Not on file   Tobacco Use    Smoking status: Never Smoker    Smokeless tobacco: Never Used   Substance and Sexual Activity    Alcohol use: Not Currently     Comment: rare     Drug use: No    Sexual activity: Not on file   Other Topics Concern    Not on file   Social History Narrative    Not on file     Social Determinants of Health     Financial Resource Strain:     Difficulty of Paying Living Expenses: Not on file   Food Insecurity:     Worried About Running Out of Food in the Last Year: Not on file    Savita of Food in the Last Year: Not on file   Transportation Needs:     Lack of Transportation (Medical): Not on file    Lack of Transportation (Non-Medical): Not on file   Physical Activity:     Days of Exercise per Week: Not on file    Minutes of Exercise per Session: Not on file   Stress:     Feeling of Stress : Not on file   Social Connections:     Frequency of Communication with Friends and Family: Not on file    Frequency of Social Gatherings with Friends and Family: Not on file    Attends Yarsani Services: Not on file    Active Member of [address] [place] [address] or Organizations: Not on file    Attends Club or Organization Meetings: Not on file    Marital Status: Not on file   Intimate Partner Violence:     Fear of Current or Ex-Partner: Not on file    Emotionally Abused: Not on file    Physically Abused: Not on file    Sexually Abused: Not on file   Housing Stability:     Unable to Pay for Housing in the Last Year: Not on file    Number of [place] in the Last Year: Not on file    Unstable Housing in the Last Year: Not on file         ALLERGIES: Codeine    Review of Systems   Constitutional: Negative for chills, fatigue and fever. HENT: Negative for congestion and sore throat. Eyes: Negative for visual disturbance. Respiratory: Negative for cough and shortness of breath. Cardiovascular: Positive for syncope. Negative for chest pain. Gastrointestinal: Positive for diarrhea and nausea. Negative for abdominal pain and vomiting. Genitourinary: Negative for dysuria and flank pain.    Musculoskeletal: Positive for neck pain. Negative for back pain. Right shoulder pain   Skin: Negative for rash and wound. Neurological: Positive for light-headedness and headaches. Negative for dizziness. Psychiatric/Behavioral: Negative for behavioral problems. Vitals:    01/30/22 1205 01/30/22 1424 01/30/22 1436 01/30/22 1500   BP: 126/72  (!) 148/73 (!) 144/83   Pulse: 77  78 84   Resp: 16 23  20   Temp: 97.9 °F (36.6 °C)      SpO2: 100%  97% 96%   Weight: 54.4 kg (120 lb)      Height: 5' 3\" (1.6 m)               Physical Exam  Vitals and nursing note reviewed. Constitutional:       General: She is not in acute distress. Appearance: She is not ill-appearing or toxic-appearing. HENT:      Head: Normocephalic and atraumatic. Eyes:      Extraocular Movements: Extraocular movements intact. Conjunctiva/sclera: Conjunctivae normal.      Pupils: Pupils are equal, round, and reactive to light. Cardiovascular:      Rate and Rhythm: Normal rate. Pulses: Normal pulses. Pulmonary:      Effort: Pulmonary effort is normal.      Breath sounds: Normal breath sounds. Musculoskeletal:      Cervical back: Spinous process tenderness and muscular tenderness (right sided) present. Neurological:      Mental Status: She is alert and oriented to person, place, and time. Comments: CN II through XII grossly intact   Psychiatric:         Mood and Affect: Mood normal.         Behavior: Behavior normal.         Thought Content: Thought content normal.         Judgment: Judgment normal.          MDM  Number of Diagnoses or Management Options  COVID-19  Injury of head, initial encounter  Vasovagal syncope  Diagnosis management comments: Patient is a 61-year-old female who presents with complaint of syncopal episode, head injury, headache and neck and shoulder pain. Reports that she has been sick with URI symptoms for the past week and has had 3 days of persistent diarrhea.   She was walking in her kitchen to give herself her methotrexate injection when she began feeling lightheaded and had witnessed syncopal episode by son falling backwards and striking the right side of her head and her right shoulder on the ground. Does not take any aspirin or blood thinners. Patient afebrile, vital signs within appropriate limits upon initial evaluation. She does have pain with palpation of the C-spine that dates into the right arm and shoulder. She was placed in a c-collar. Well. CT head and cervical spine, shoulder x-rays, chest x-ray, EKG and labs including rapid COVID given her history of URI symptoms with diarrhea over the past week. Labs Reviewed  COVID-19 RAPID TEST - Abnormal; Notable for the following components:     COVID-19 rapid test           Detected (*)            All other components within normal limits  CBC WITH AUTOMATED DIFF - Abnormal; Notable for the following components:     MPV                           8.9 (*)                NEUTROPHILS                   37 (*)                 LYMPHOCYTES                   57 (*)              All other components within normal limits  METABOLIC PANEL, COMPREHENSIVE - Abnormal; Notable for the following components:     Chloride                      109 (*)                Anion gap                     5 (*)                  Glucose                       104 (*)                A-G Ratio                     1.1 (*)             All other components within normal limits  URINE MICROSCOPIC    CXR shows:    No acute disease. CT cervical spine: 1. No acute osseous abnormality. 2. Incidental right thyroid nodule is indeterminate. Follow-up outpatient   ultrasound thyroid could enable further characterization, as warranted. CT head:   1. No evidence of hemorrhage. 2. Right frontal lesion of unclear significance, most likely related to prior   insult or chronic small vessel ischemia.  If a new infarct is suspected   clinically and more imaging is needed, an MRI may be helpful (unless there has     Pt with normal neurological exam, history consistent with vaso vagal episode likely due to dehydration 2/2 diarrhea/covid19. She received 1 L fluid bolus and tylenol for pain. Upon re-evaluation, she states she is mostly having pain in the right shoulder  been outside brain imaging previously which could be used for correlation). Rapid Covid +    All results discussed with patient at bedside and did discuss result findings with her son as well. Instructed to follow up closely with her PCP this week for further evaluation of thyroid nodule. Discussed reasons to return to the ER. Pt and son verbalize understanding and are agreeable to plan.           Procedures

## 2022-03-18 PROBLEM — Z98.890 HISTORY OF COLONOSCOPY: Status: ACTIVE | Noted: 2019-05-02

## 2022-06-15 ENCOUNTER — OFFICE VISIT (OUTPATIENT)
Dept: FAMILY MEDICINE CLINIC | Facility: CLINIC | Age: 62
End: 2022-06-15
Payer: COMMERCIAL

## 2022-06-15 VITALS
BODY MASS INDEX: 21.16 KG/M2 | WEIGHT: 119.4 LBS | HEIGHT: 63 IN | SYSTOLIC BLOOD PRESSURE: 116 MMHG | DIASTOLIC BLOOD PRESSURE: 72 MMHG

## 2022-06-15 DIAGNOSIS — F33.41 RECURRENT MAJOR DEPRESSIVE DISORDER, IN PARTIAL REMISSION (HCC): Primary | ICD-10-CM

## 2022-06-15 DIAGNOSIS — E04.1 THYROID NODULE: ICD-10-CM

## 2022-06-15 PROCEDURE — 99213 OFFICE O/P EST LOW 20 MIN: CPT | Performed by: FAMILY MEDICINE

## 2022-06-15 RX ORDER — DULOXETIN HYDROCHLORIDE 60 MG/1
60 CAPSULE, DELAYED RELEASE ORAL DAILY
Qty: 90 CAPSULE | Refills: 1 | Status: SHIPPED | OUTPATIENT
Start: 2022-06-15

## 2022-06-15 RX ORDER — FLUTICASONE PROPIONATE 50 MCG
2 SPRAY, SUSPENSION (ML) NASAL DAILY
Qty: 16 G | Refills: 2 | Status: SHIPPED | OUTPATIENT
Start: 2022-06-15

## 2022-06-15 ASSESSMENT — PATIENT HEALTH QUESTIONNAIRE - PHQ9
SUM OF ALL RESPONSES TO PHQ QUESTIONS 1-9: 1
SUM OF ALL RESPONSES TO PHQ QUESTIONS 1-9: 1
1. LITTLE INTEREST OR PLEASURE IN DOING THINGS: 1
SUM OF ALL RESPONSES TO PHQ9 QUESTIONS 1 & 2: 1
SUM OF ALL RESPONSES TO PHQ QUESTIONS 1-9: 1
SUM OF ALL RESPONSES TO PHQ QUESTIONS 1-9: 1
2. FEELING DOWN, DEPRESSED OR HOPELESS: 0

## 2022-06-15 NOTE — PROGRESS NOTES
Subjective:   Gene Elder is a 58 y.o. female presents today for depression and mood followup . She is doing fine and really has no complaints other than anxious about an incidental thyroid nodule noted on CT scan in ER several months ago. PHQ9 Administered: PHQ-9 Total Score: 1 (6/15/2022  4:07 PM)    Allergies   Allergen Reactions    Codeine Palpitations     PMH, MEDS reviewed     Objective:   Blood pressure 116/72, height 5' 3\" (1.6 m), weight 119 lb 6.4 oz (54.2 kg). General- Pleasant and no distress  Psych- alert and oriented to person, place and time  Mood and affect are appropriate to situation  Musculoskeletal - Gait and station examination reveals mid-position with no abnormalities. Neurological- grossly intact. Neck exam-I do not palpate any nodule  Much of today's visit spent counseling with review of symptoms, disposition, prognosis, and risk/benefit of treatment plan options in addition to limited behavioral counseling and recommendations, and the counseling has included my impressions of any previous diagnostic results, but also the importance of compliance and follow up, and we have talked about risk factor reduction where appropriate      Assessment/Plan:     1. Recurrent major depressive disorder, in partial remission ([facility] [address]    2. Thyroid nodule        1. Recurrent major depressive disorder, in partial remission (HCC)  -     DULoxetine (CYMBALTA) 60 MG extended release capsule; Take 1 capsule by mouth daily, Disp-90 capsule, R-1Normal  2. Thyroid nodule  -     Ambulatory referral to Endocrinology    To call if notions of suicide develop. Increase exercise, avoid alcohol and other depressing drugs. There was an incidental 2 cm nodule left thyroid noted on the CT of neck back in February.   We will have an ultrasound performed to further evaluate

## 2022-06-23 ENCOUNTER — TELEPHONE (OUTPATIENT)
Dept: FAMILY MEDICINE CLINIC | Facility: CLINIC | Age: 62
End: 2022-06-23

## 2022-06-23 NOTE — TELEPHONE ENCOUNTER
It looks like you wanted her to have ultrasound done last time she was here, but it doesn't look like it was ever ordered. Can you order this?

## 2022-07-14 ENCOUNTER — OFFICE VISIT (OUTPATIENT)
Dept: ENDOCRINOLOGY | Age: 62
End: 2022-07-14
Payer: COMMERCIAL

## 2022-07-14 VITALS
DIASTOLIC BLOOD PRESSURE: 88 MMHG | WEIGHT: 119 LBS | HEART RATE: 97 BPM | HEIGHT: 63 IN | BODY MASS INDEX: 21.09 KG/M2 | SYSTOLIC BLOOD PRESSURE: 128 MMHG | OXYGEN SATURATION: 98 %

## 2022-07-14 DIAGNOSIS — E04.1 THYROID NODULE: ICD-10-CM

## 2022-07-14 LAB — TSH W FREE THYROID IF ABNORMAL: 3.59 UIU/ML (ref 0.36–3.74)

## 2022-07-14 PROCEDURE — 76536 US EXAM OF HEAD AND NECK: CPT | Performed by: INTERNAL MEDICINE

## 2022-07-14 PROCEDURE — 10005 FNA BX W/US GDN 1ST LES: CPT | Performed by: INTERNAL MEDICINE

## 2022-07-14 PROCEDURE — 99203 OFFICE O/P NEW LOW 30 MIN: CPT | Performed by: INTERNAL MEDICINE

## 2022-07-14 RX ORDER — GOLIMUMAB 50 MG/.5ML
INJECTION, SOLUTION SUBCUTANEOUS
COMMUNITY

## 2022-07-14 RX ORDER — FOLIC ACID 1 MG/1
TABLET ORAL
COMMUNITY
Start: 2022-06-14

## 2022-07-14 ASSESSMENT — ENCOUNTER SYMPTOMS
CONSTIPATION: 0
DIARRHEA: 0

## 2022-07-14 NOTE — PROGRESS NOTES
Magnus Silverman MD, [facility] Endocrinology and Thyroid [facility]odule [facility]  [address], [address], [address]  Phone [phone]  Facsimile [phone]          Melvin Irwin is a 58 y.o. female seen 7/14/2022 at the request of Dr. Glen Rutledge for the evaluation of thyroid nodule        ASSESSMENT AND PLAN:    1. Thyroid nodule  Thyroid ultrasound performed today revealed the presence of a dominant nodule in the mid-superior right lobe. I performed an FNA biopsy of the mid-superior right lobe nodule today. The specimen will be sent to [facility]  if needed. Assuming the biopsy is benign, I will have her return for a follow up ultrasound in 6 months to document stability. She has no compressive symptoms at this point which would warrant referral for thyroidectomy. I will assess her thyroid function today. Procedures:    Thyroid ultrasound 7/14/2022: Right lobe 1.49 x 1.70 x 4.35 cm, fairly homogeneous echotexture. In the mid to superior right lobe posteriorly there is a solid isoechoic nodule measuring 1.02 x 1.56 x 2.50 cm without microcalcifications (TR 3). Isthmus measures 0.17 cm. Left lobe 1.64 x 1.12 x 4.22 cm, fairly homogeneous echotexture. In the mid and superior left lobe posteriorly there is a complex, predominantly solid isoechoic nodule measuring 0.32 x 0.39 x 0.42 cm (TR 3). Thyroid FNA Biopsy Procedure Note:    Informed consent was obtained from the patient. I explained the small risk of bleeding and infection. A timeout was performed. The neck was cleansed using alcohol pads. The skin was anesthetized using 1% lidocaine. 5 passes with a 27-gauge needle were made into the mid-superior right lobe nodule using ultrasound-guidance. The needle was visualized in the nodule on all attempts. The material from 3 passes was placed in CytoLyt solution and 2 passes into the Af[facility] [address] tube.   The patient tolerated the procedure well. Post-procedure ultrasound revealed no evidence of swelling or hemorrhage. The biopsy site was covered with a bandaid. The patient was advised to call with swelling, dysphagia, excessive bruising or severe neck pain. Follow-up and Dispositions    · Return in about 6 months (around 1/14/2023). HISTORY OF PRESENT ILLNESS:    THYROID NODULE / MULTINODULAR GOITER    Presentation: Incidentally noted on CT of the cervical spine. Thyroid Cancer Risk Factors: There is no family history of thyroid cancer. There is no history of radiation to the head/neck. Symptoms:  She reports some left submandibular swelling. Denies anterior neck enlargement/pain/pressure. Denies dysphagia. She reports an occasional sensation that her throat is closing up which causes mild positional shortness of breath. She reports intermittent hoarseness. Imaging:  CT cervical spine without contrast 1/30/2022: Indeterminate right lower thyroid nodule measuring up to 2 cm. Trachea is midline and patent. Lung apices are clear. Thyroid ultrasound 7/14/2022: Right lobe 1.49 x 1.70 x 4.35 cm, fairly homogeneous echotexture. In the mid to superior right lobe posteriorly there is a solid isoechoic nodule measuring 1.02 x 1.56 x 2.50 cm without microcalcifications (TR 3). Isthmus measures 0.17 cm. Left lobe 1.64 x 1.12 x 4.22 cm, fairly homogeneous echotexture. In the mid and superior left lobe posteriorly there is a complex, predominantly solid isoechoic nodule measuring 0.32 x 0.39 x 0.42 cm (TR 3). Labs:      Review of Systems   Constitutional: Positive for fatigue (intermittent). Negative for diaphoresis and unexpected weight change. Cardiovascular: Negative for palpitations. Gastrointestinal: Negative for constipation and diarrhea. Endocrine: Negative for cold intolerance and heat intolerance. Neurological: Negative for tremors.        Vital Signs:  /88 (Site: Left Upper Arm, Position: Sitting)   Pulse 97   Ht 5' 3\" (1.6 m)   Wt 119 lb (54 kg)   SpO2 98%   BMI 21.08 kg/m²     Wt Readings from Last 3 Encounters:   07/14/22 119 lb (54 kg)   06/15/22 119 lb 6.4 oz (54.2 kg)   05/16/22 120 lb (54.4 kg)       Physical Exam  Constitutional:       General: She is not in acute distress. Neck:      Thyroid: No thyroid mass or thyromegaly. Comments: Right lobe of thyroid full compared to left lobe. Cardiovascular:      Rate and Rhythm: Normal rate and regular rhythm. Lymphadenopathy:      Cervical: No cervical adenopathy. Neurological:      Motor: No tremor. Orders Placed This Encounter   Procedures    TSH with Reflex     Standing Status:   Future     Standing Expiration Date:   7/14/2023    US, HEAD/NECK TISSUES,REAL TIME    DE FINE NEEDLE ASPIRATION BX W/US GDN 1ST LESION       Current Outpatient Medications   Medication Sig Dispense Refill    folic acid (FOLVITE) 1 MG tablet 5 TABS ORALLY ONCE A DAY 90 DAY(S)      Golimumab (SIMPONI) 50 MG/0.5ML SOAJ Inject into the skin Every 8wks. Pt isn't sure of the dose.  DULoxetine (CYMBALTA) 60 MG extended release capsule Take 1 capsule by mouth daily 90 capsule 1    fluticasone (FLONASE) 50 MCG/ACT nasal spray 2 sprays by Nasal route daily 16 g 2    hydroxychloroquine (PLAQUENIL) 200 MG tablet Take 1 tablet by mouth daily       Methotrexate, PF, (RASUVO) 25 MG/0.5ML chemo injection pen Inject into the skin every 7 days       No current facility-administered medications for this visit.

## 2022-07-22 ENCOUNTER — TELEPHONE (OUTPATIENT)
Dept: ENDOCRINOLOGY | Age: 62
End: 2022-07-22

## 2022-07-22 NOTE — TELEPHONE ENCOUNTER
Her biopsy was performed 7/14/2022 and I have still not seen a result. It was not listed on the main page of the portal either.

## 2022-07-27 NOTE — TELEPHONE ENCOUNTER
Patient left a message for a call back concerning biopsy. Left message for patient to contact office again.

## 2022-08-02 ENCOUNTER — TELEPHONE (OUTPATIENT)
Dept: ENDOCRINOLOGY | Age: 62
End: 2022-08-02

## 2022-08-02 NOTE — TELEPHONE ENCOUNTER
Spoke with Dr. Erika Mac. Patient's initial biopsy results came back indeterminate but the genetic testing came back benign. Will notify patient.

## 2022-08-31 ENCOUNTER — CLINICAL DOCUMENTATION (OUTPATIENT)
Dept: ENDOCRINOLOGY | Age: 62
End: 2022-08-31

## 2022-08-31 NOTE — PROGRESS NOTES
Spoke to [employer] on 8/31/22 at 10:25am who told me they never got the [facility] Test Requisition Addendum Form that was originally faxed on 7/25/22 by our manager, Dickson Plata. I refaxed [facility] Test Requisition Addendum Form today, 8/31/22 ,at 10:36am. Received confirmation that the fax went through to [phone].

## 2023-06-07 ENCOUNTER — OFFICE VISIT (OUTPATIENT)
Dept: ENDOCRINOLOGY | Age: 63
End: 2023-06-07
Payer: COMMERCIAL

## 2023-06-07 VITALS
SYSTOLIC BLOOD PRESSURE: 122 MMHG | WEIGHT: 129 LBS | HEART RATE: 84 BPM | OXYGEN SATURATION: 98 % | BODY MASS INDEX: 22.85 KG/M2 | DIASTOLIC BLOOD PRESSURE: 86 MMHG

## 2023-06-07 DIAGNOSIS — E04.1 THYROID NODULE: Primary | ICD-10-CM

## 2023-06-07 PROCEDURE — 99213 OFFICE O/P EST LOW 20 MIN: CPT | Performed by: INTERNAL MEDICINE

## 2023-06-07 PROCEDURE — 76536 US EXAM OF HEAD AND NECK: CPT | Performed by: INTERNAL MEDICINE

## 2023-06-07 ASSESSMENT — ENCOUNTER SYMPTOMS
DIARRHEA: 0
CONSTIPATION: 1

## 2023-06-07 NOTE — PROGRESS NOTES
Magnus Scales MD, [facility] Endocrinology and Thyroid Nodule Clinic  [address], [address] [address] [facility], [address]  Phone [phone]  Facsimile [phone]          Valentine Maravilla is a 61 y.o. female seen 6/7/2023 for follow up of thyroid nodule        ASSESSMENT AND PLAN:    1. Thyroid nodule  Status post FNA biopsy of the mid-superior right lobe nodules 7/2022 with cytology revealing atypia of undetermined significance. [facility] genomic sequencing  was benign and we elected to continue to follow the nodule closely. Thyroid ultrasound performed today revealed that the nodule was fairly stable in size. I will have her return in 1 year for a follow-up ultrasound to document stability. She has no compressive symptoms at this point which would warrant referral for thyroidectomy. Procedures:    Thyroid ultrasound 6/7/2023: Right lobe 1.67 x 1.83 x 4.19 cm. In the mid to superior right lobe posteriorly there is a solid isoechoic nodule measuring 1.28 x 1.66 x 2.69 cm without microcalcifications (TR 3). Isthmus measures 0.14 cm. Left lobe 1.54 x 1.00 x 3.70 cm, homogeneous echotexture. In the mid left lobe posteriorly there is a complex, predominantly solid isoechoic nodule measuring 0.39 x 0.46 x 0.46 cm (TR 3). Follow-up and Dispositions    Return in about 1 year (around 6/7/2024). HISTORY OF PRESENT ILLNESS:    THYROID NODULE / MULTINODULAR GOITER    Presentation: Incidentally noted on CT of the cervical spine. Thyroid Cancer Risk Factors: There is no family history of thyroid cancer. There is no history of radiation to the head/neck. Symptoms:  Denies anterior neck enlargement/pain/pressure. Denies dysphagia, positional shortness of breath, hoarseness. Imaging:  CT cervical spine without contrast 1/30/2022: Indeterminate right lower thyroid nodule measuring up to 2 cm. Trachea is midline and patent. Lung apices are clear.   Thyroid

## 2023-08-17 ENCOUNTER — OFFICE VISIT (OUTPATIENT)
Dept: ORTHOPEDIC SURGERY | Age: 63
End: 2023-08-17
Payer: COMMERCIAL

## 2023-08-17 DIAGNOSIS — M54.2 CERVICALGIA: ICD-10-CM

## 2023-08-17 DIAGNOSIS — M25.511 CHRONIC RIGHT SHOULDER PAIN: Primary | ICD-10-CM

## 2023-08-17 DIAGNOSIS — M75.41 IMPINGEMENT SYNDROME OF RIGHT SHOULDER: ICD-10-CM

## 2023-08-17 DIAGNOSIS — G89.29 CHRONIC RIGHT SHOULDER PAIN: Primary | ICD-10-CM

## 2023-08-17 PROCEDURE — 99203 OFFICE O/P NEW LOW 30 MIN: CPT | Performed by: STUDENT IN AN ORGANIZED HEALTH CARE EDUCATION/TRAINING PROGRAM

## 2023-08-17 RX ORDER — PREDNISONE 5 MG/1
1 TABLET ORAL ONCE
Qty: 1 EACH | Refills: 0 | Status: SHIPPED | OUTPATIENT
Start: 2023-08-17 | End: 2023-08-17

## 2023-08-17 NOTE — PROGRESS NOTES
Mckee, Neer, Speeds, Spurlings; Mildly painful Jobes  Normal capillary refill / 2+ radial pulse   Sensation intact to light touch          DIAGNOSTIC IMAGING:     X-ray 3 vw RIGHT shoulder Grashey / axillary / outlet taken today for shoulder pain    Findings: No acute fracture or dislocation. No effusion. Type I acromion. Mild AC joint arthritis. [address] joint without significant degenerative changes. Impression: Mild AC joint arthritis. I independently interpreted XR taken today    ASSESSMENT/PLAN:   1. Chronic right shoulder pain    2. Cervicalgia    3. Impingement syndrome of right shoulder         She has more pain to the paraspinal and upper trapezius muscles as opposed to significant impingement, but she has some mild findings of impingement. I wouldn't recommend an injection today. I have advised a course of physical therapy for the neck and shoulder. I recommended continued NSAID use, but, we will do a short course of prednisone for pain relief. Orders / medications today:   Orders Placed This Encounter    XR SHOULDER RIGHT (MIN 2 VIEWS)     Right Shoulder: Grashey, Outlet & Axillary     Standing Status:   Future     Number of Occurrences:   1     Standing Expiration Date:   8/17/2024    Ambulatory referral to Physical Therapy     Referral Priority:   Routine     Referral Type:   Eval and Treat     Referral Reason:   Patient Preference     Number of Visits Requested:   1    predniSONE 5 MG (21) TBPK     Sig: Take 1 dose pack by mouth once for 1 dose Take as dose pack directs     Dispense:  1 each     Refill:  0      Follow up: Return in about 6 weeks (around 9/28/2023). The patient expressed understanding and agreed with the plan. Piedad Gonzalez MD   [facility]opaed[facility] and [address] [place] [address] [facility]     This document was created using voice recognition software so frequent mistakes are possible.  For any concerns about the wording of this document, please contact its creator for

## 2023-09-13 SDOH — ECONOMIC STABILITY: HOUSING INSECURITY
IN THE LAST 12 MONTHS, WAS THERE A TIME WHEN YOU DID NOT HAVE A STEADY PLACE TO SLEEP OR SLEPT IN A SHELTER (INCLUDING NOW)?: NO

## 2023-09-13 SDOH — ECONOMIC STABILITY: INCOME INSECURITY: HOW HARD IS IT FOR YOU TO PAY FOR THE VERY BASICS LIKE FOOD, HOUSING, MEDICAL CARE, AND HEATING?: SOMEWHAT HARD

## 2023-09-13 SDOH — ECONOMIC STABILITY: TRANSPORTATION INSECURITY
IN THE PAST 12 MONTHS, HAS LACK OF TRANSPORTATION KEPT YOU FROM MEETINGS, WORK, OR FROM GETTING THINGS NEEDED FOR DAILY LIVING?: PATIENT DECLINED

## 2023-09-13 SDOH — ECONOMIC STABILITY: FOOD INSECURITY: WITHIN THE PAST 12 MONTHS, THE FOOD YOU BOUGHT JUST DIDN'T LAST AND YOU DIDN'T HAVE MONEY TO GET MORE.: PATIENT DECLINED

## 2023-09-13 SDOH — ECONOMIC STABILITY: FOOD INSECURITY: WITHIN THE PAST 12 MONTHS, YOU WORRIED THAT YOUR FOOD WOULD RUN OUT BEFORE YOU GOT MONEY TO BUY MORE.: PATIENT DECLINED

## 2023-09-13 ASSESSMENT — PATIENT HEALTH QUESTIONNAIRE - PHQ9
2. FEELING DOWN, DEPRESSED OR HOPELESS: 1
7. TROUBLE CONCENTRATING ON THINGS, SUCH AS READING THE NEWSPAPER OR WATCHING TELEVISION: 0
8. MOVING OR SPEAKING SO SLOWLY THAT OTHER PEOPLE COULD HAVE NOTICED. OR THE OPPOSITE, BEING SO FIGETY OR RESTLESS THAT YOU HAVE BEEN MOVING AROUND A LOT MORE THAN USUAL: 0
10. IF YOU CHECKED OFF ANY PROBLEMS, HOW DIFFICULT HAVE THESE PROBLEMS MADE IT FOR YOU TO DO YOUR WORK, TAKE CARE OF THINGS AT HOME, OR GET ALONG WITH OTHER PEOPLE: 1
1. LITTLE INTEREST OR PLEASURE IN DOING THINGS: NEARLY EVERY DAY
SUM OF ALL RESPONSES TO PHQ QUESTIONS 1-9: 5
6. FEELING BAD ABOUT YOURSELF - OR THAT YOU ARE A FAILURE OR HAVE LET YOURSELF OR YOUR FAMILY DOWN: NOT AT ALL
2. FEELING DOWN, DEPRESSED OR HOPELESS: SEVERAL DAYS
SUM OF ALL RESPONSES TO PHQ QUESTIONS 1-9: 5
9. THOUGHTS THAT YOU WOULD BE BETTER OFF DEAD, OR OF HURTING YOURSELF: NOT AT ALL
3. TROUBLE FALLING OR STAYING ASLEEP: NOT AT ALL
8. MOVING OR SPEAKING SO SLOWLY THAT OTHER PEOPLE COULD HAVE NOTICED. OR THE OPPOSITE - BEING SO FIDGETY OR RESTLESS THAT YOU HAVE BEEN MOVING AROUND A LOT MORE THAN USUAL: NOT AT ALL
7. TROUBLE CONCENTRATING ON THINGS, SUCH AS READING THE NEWSPAPER OR WATCHING TELEVISION: NOT AT ALL
10. IF YOU CHECKED OFF ANY PROBLEMS, HOW DIFFICULT HAVE THESE PROBLEMS MADE IT FOR YOU TO DO YOUR WORK, TAKE CARE OF THINGS AT HOME, OR GET ALONG WITH OTHER PEOPLE: SOMEWHAT DIFFICULT
5. POOR APPETITE OR OVEREATING: 0
SUM OF ALL RESPONSES TO PHQ9 QUESTIONS 1 & 2: 4
SUM OF ALL RESPONSES TO PHQ QUESTIONS 1-9: 5
4. FEELING TIRED OR HAVING LITTLE ENERGY: 1
SUM OF ALL RESPONSES TO PHQ QUESTIONS 1-9: 5
1. LITTLE INTEREST OR PLEASURE IN DOING THINGS: 3
5. POOR APPETITE OR OVEREATING: NOT AT ALL
6. FEELING BAD ABOUT YOURSELF - OR THAT YOU ARE A FAILURE OR HAVE LET YOURSELF OR YOUR FAMILY DOWN: 0
9. THOUGHTS THAT YOU WOULD BE BETTER OFF DEAD, OR OF HURTING YOURSELF: 0
4. FEELING TIRED OR HAVING LITTLE ENERGY: SEVERAL DAYS
SUM OF ALL RESPONSES TO PHQ QUESTIONS 1-9: 5
3. TROUBLE FALLING OR STAYING ASLEEP: 0

## 2023-09-14 ENCOUNTER — OFFICE VISIT (OUTPATIENT)
Dept: FAMILY MEDICINE CLINIC | Facility: CLINIC | Age: 63
End: 2023-09-14
Payer: COMMERCIAL

## 2023-09-14 VITALS
BODY MASS INDEX: 22.68 KG/M2 | RESPIRATION RATE: 16 BRPM | HEART RATE: 80 BPM | DIASTOLIC BLOOD PRESSURE: 67 MMHG | TEMPERATURE: 97.3 F | WEIGHT: 128 LBS | HEIGHT: 63 IN | SYSTOLIC BLOOD PRESSURE: 130 MMHG | OXYGEN SATURATION: 97 %

## 2023-09-14 DIAGNOSIS — M06.9 RHEUMATOID ARTHRITIS, INVOLVING UNSPECIFIED SITE, UNSPECIFIED WHETHER RHEUMATOID FACTOR PRESENT (HCC): ICD-10-CM

## 2023-09-14 DIAGNOSIS — L60.0 INGROWN TOENAIL OF LEFT FOOT: Primary | ICD-10-CM

## 2023-09-14 DIAGNOSIS — F33.41 RECURRENT MAJOR DEPRESSIVE DISORDER, IN PARTIAL REMISSION (HCC): ICD-10-CM

## 2023-09-14 DIAGNOSIS — J30.89 PERENNIAL ALLERGIC RHINITIS: ICD-10-CM

## 2023-09-14 PROCEDURE — 99213 OFFICE O/P EST LOW 20 MIN: CPT | Performed by: FAMILY MEDICINE

## 2023-09-14 RX ORDER — FLUTICASONE PROPIONATE 50 MCG
1 SPRAY, SUSPENSION (ML) NASAL DAILY
COMMUNITY
End: 2023-09-14 | Stop reason: SDUPTHER

## 2023-09-14 RX ORDER — DULOXETIN HYDROCHLORIDE 60 MG/1
60 CAPSULE, DELAYED RELEASE ORAL DAILY
Qty: 90 CAPSULE | Refills: 1 | Status: SHIPPED | OUTPATIENT
Start: 2023-09-14

## 2023-09-14 RX ORDER — FLUTICASONE PROPIONATE 50 MCG
1 SPRAY, SUSPENSION (ML) NASAL DAILY
Qty: 16 G | Refills: 11 | Status: SHIPPED | OUTPATIENT
Start: 2023-09-14

## 2023-09-14 RX ORDER — GOLIMUMAB 50 MG/.5ML
INJECTION, SOLUTION SUBCUTANEOUS
COMMUNITY

## 2023-09-14 NOTE — PROGRESS NOTES
meriting change in the plan      Reassurance given regarding the toenail. Allow it to grow out on its own. No evidence of infection. Went ahead and refilled her other medicines  Followup:   Return in about 6 months (around 3/14/2024), or recheck.

## 2024-06-11 ENCOUNTER — OFFICE VISIT (OUTPATIENT)
Dept: ENDOCRINOLOGY | Age: 64
End: 2024-06-11
Payer: COMMERCIAL

## 2024-06-11 VITALS
WEIGHT: 122 LBS | BODY MASS INDEX: 21.61 KG/M2 | SYSTOLIC BLOOD PRESSURE: 110 MMHG | OXYGEN SATURATION: 97 % | DIASTOLIC BLOOD PRESSURE: 78 MMHG | HEART RATE: 105 BPM

## 2024-06-11 DIAGNOSIS — E04.1 THYROID NODULE: Primary | ICD-10-CM

## 2024-06-11 DIAGNOSIS — E04.1 THYROID NODULE: ICD-10-CM

## 2024-06-11 LAB
T4 FREE SERPL-MCNC: 1 NG/DL (ref 0.9–1.7)
TSH W FREE THYROID IF ABNORMAL: 6.08 UIU/ML (ref 0.27–4.2)

## 2024-06-11 PROCEDURE — 76536 US EXAM OF HEAD AND NECK: CPT | Performed by: INTERNAL MEDICINE

## 2024-06-11 PROCEDURE — 99213 OFFICE O/P EST LOW 20 MIN: CPT | Performed by: INTERNAL MEDICINE

## 2024-06-11 ASSESSMENT — ENCOUNTER SYMPTOMS
CONSTIPATION: 1
DIARRHEA: 0

## 2024-06-11 NOTE — PROGRESS NOTES
Magnus Escamilla MD, [facility] Endocrinology and Thyroid Nodule Clinic  [address] [place] [address], [address]  [place] [address]  Phone [phone]  Facsimile [phone]          Linda Alvarez is a 64 y.o. female seen 6/11/2024 for follow up of thyroid nodule        ASSESSMENT AND PLAN:    1. Thyroid nodule  Status post FNA biopsy of the mid-superior right lobe nodules 7/2022 with cytology revealing atypia of undetermined significance.  [facility] genomic sequencing  was benign and we elected to continue to follow the nodule closely.  Thyroid ultrasound performed today revealed that the nodule was stable in size.  I will have her return in 1 year for a follow-up ultrasound to document stability.  She has no compressive symptoms at this point which would warrant referral for thyroidectomy.  I will assess her thyroid function today.          Procedures:    Thyroid ultrasound 6/11/2024: Right lobe 1.46 x 1.78 x 3.94 cm.  In the mid to superior right lobe posteriorly there is a solid isoechoic nodule measuring 1.19 x 1.72 x 2.62 cm without microcalcifications (TR 3).  Isthmus measures 0.19 cm.  Left lobe 1.44 x 1.03 x 3.72 cm, homogeneous echotexture, no obvious nodules.      Follow-up and Dispositions    Return in about 1 year (around 6/11/2025).         HISTORY OF PRESENT ILLNESS:    THYROID NODULE / MULTINODULAR GOITER    Presentation: Incidentally noted on CT of the cervical spine.    Thyroid Cancer Risk Factors:  There is no family history of thyroid cancer.  There is no history of radiation to the head/neck.     Symptoms:  She thinks the right side of her thyroid is thicker than the left.  Denies anterior neck pain/pressure.  Denies dysphagia, positional shortness of breath, hoarseness.    Imaging:  CT cervical spine without contrast 1/30/2022: Indeterminate right lower thyroid nodule measuring up to 2 cm.  Trachea is midline and patent.  Lung apices are clear.  Thyroid ultrasound 7/14/2022:

## 2024-06-12 ENCOUNTER — TELEPHONE (OUTPATIENT)
Dept: ENDOCRINOLOGY | Age: 64
End: 2024-06-12

## 2024-06-12 DIAGNOSIS — E04.1 THYROID NODULE: Primary | ICD-10-CM

## 2024-06-12 NOTE — TELEPHONE ENCOUNTER
Her thyroid level was abnormal, suggesting that her thyroid gland is underactive.  I would like for her to get her labs repeated in 6 weeks.

## 2024-07-25 DIAGNOSIS — E04.1 THYROID NODULE: ICD-10-CM

## 2024-07-25 LAB
T4 FREE SERPL-MCNC: 1 NG/DL (ref 0.9–1.7)
TSH W FREE THYROID IF ABNORMAL: 7.47 UIU/ML (ref 0.27–4.2)

## 2024-07-26 LAB — THYROPEROXIDASE AB SERPL-ACNC: 15 IU/ML (ref 0–34)

## 2024-11-19 ENCOUNTER — TELEPHONE (OUTPATIENT)
Dept: FAMILY MEDICINE CLINIC | Facility: CLINIC | Age: 64
End: 2024-11-19

## 2024-11-19 NOTE — TELEPHONE ENCOUNTER
Returned patients call in regards to message about lip swelling and blisters.  Advised that first available appt was the first week in Dec. Pt declined wanting to speak with the nurse or scheduling. Stated that she would call back.

## (undated) DEVICE — ZIMMER® STERILE DISPOSABLE TOURNIQUET CUFF WITH PLC, DUAL PORT, SINGLE BLADDER, 30 IN. (76 CM)

## (undated) DEVICE — OCCLUSIVE GAUZE STRIP,3% BISMUTH TRIBROMOPHENATE IN PETROLATUM BLEND: Brand: XEROFORM

## (undated) DEVICE — STERILE HOOK LOCK LATEX FREE ELASTIC BANDAGE 6INX5YD: Brand: HOOK LOCK™

## (undated) DEVICE — (D)PREP SKN CHLRAPRP APPL 26ML -- CONVERT TO ITEM 371833

## (undated) DEVICE — SET IRRIG DST FLX M CONN

## (undated) DEVICE — SET IRRIG L94IN ID0.281IN W/ 4.5IN DST FLX CONN 2 LD ON OFF

## (undated) DEVICE — STOCKINETTE,IMPERVIOUS,12X48,STERILE: Brand: MEDLINE

## (undated) DEVICE — SINGLE PORT MANIFOLD: Brand: NEPTUNE 2

## (undated) DEVICE — BANDAGE COBAN 6 IN WND 6INX5YD FOAM

## (undated) DEVICE — T-DRAPE,EXTREMITY,STERILE: Brand: MEDLINE

## (undated) DEVICE — PADDING CAST W4INXL4YD ST COT COHESIVE HND TEARABLE SPEC

## (undated) DEVICE — NEEDLE HYPO 18GA L1.5IN PNK S STL HUB POLYPR SHLD REG BVL

## (undated) DEVICE — AMD ANTIMICROBIAL GAUZE SPONGES,12 PLY USP TYPE VII, 0.2% POLYHEXAMETHYLENE BIGUANIDE HCI (PHMB): Brand: CURITY

## (undated) DEVICE — BLADE SHV CUT MENIS AGG + 4MM --

## (undated) DEVICE — WEREWOLF FLOW 50 COBLATION WAND: Brand: COBLATION

## (undated) DEVICE — SOLUTION IRRIG 3000ML 0.9% SOD CHL FLX CONT 0797208] ICU MEDICAL INC]

## (undated) DEVICE — SYR LR LCK 1ML GRAD NSAF 30ML --

## (undated) DEVICE — KNEE ARTHRO LEE-ROBERSON: Brand: MEDLINE INDUSTRIES, INC.

## (undated) DEVICE — BANDAGE COMPR SELF ADH 5 YDX6 IN TAN STRL PREMIERPRO LF

## (undated) DEVICE — INTENDED FOR TISSUE SEPARATION, AND OTHER PROCEDURES THAT REQUIRE A SHARP SURGICAL BLADE TO PUNCTURE OR CUT.: Brand: BARD-PARKER ® STAINLESS STEEL BLADES